# Patient Record
Sex: MALE | Race: WHITE | NOT HISPANIC OR LATINO | Employment: OTHER
[De-identification: names, ages, dates, MRNs, and addresses within clinical notes are randomized per-mention and may not be internally consistent; named-entity substitution may affect disease eponyms.]

---

## 2020-06-16 ENCOUNTER — IMAGING SERVICES (OUTPATIENT)
Dept: OTHER | Age: 65
End: 2020-06-16

## 2020-07-09 ENCOUNTER — APPOINTMENT (OUTPATIENT)
Dept: NEUROSURGERY | Age: 65
End: 2020-07-09

## 2020-07-28 ENCOUNTER — TELEPHONE (OUTPATIENT)
Dept: NEUROSURGERY | Age: 65
End: 2020-07-28

## 2020-08-05 ENCOUNTER — TELEPHONE (OUTPATIENT)
Dept: NEUROSURGERY | Age: 65
End: 2020-08-05

## 2020-08-06 ENCOUNTER — OFFICE VISIT (OUTPATIENT)
Dept: NEUROSURGERY | Age: 65
End: 2020-08-06

## 2020-08-06 ENCOUNTER — TELEPHONE (OUTPATIENT)
Dept: NEUROSURGERY | Age: 65
End: 2020-08-06

## 2020-08-06 DIAGNOSIS — G89.29 CHRONIC BILATERAL LOW BACK PAIN, UNSPECIFIED WHETHER SCIATICA PRESENT: Primary | ICD-10-CM

## 2020-08-06 DIAGNOSIS — M54.50 CHRONIC BILATERAL LOW BACK PAIN, UNSPECIFIED WHETHER SCIATICA PRESENT: Primary | ICD-10-CM

## 2020-08-06 DIAGNOSIS — M54.2 NECK PAIN: Primary | ICD-10-CM

## 2020-08-06 PROCEDURE — 99203 OFFICE O/P NEW LOW 30 MIN: CPT | Performed by: NEUROLOGICAL SURGERY

## 2020-08-06 RX ORDER — ATORVASTATIN CALCIUM 10 MG/1
10 TABLET, FILM COATED ORAL DAILY
COMMUNITY
End: 2022-06-06

## 2020-08-06 SDOH — HEALTH STABILITY: MENTAL HEALTH: HOW OFTEN DO YOU HAVE A DRINK CONTAINING ALCOHOL?: NEVER

## 2020-08-06 ASSESSMENT — PAIN SCALES - GENERAL: PAINLEVEL: 5-6

## 2020-09-28 ENCOUNTER — TELEPHONE (OUTPATIENT)
Dept: NEUROSURGERY | Age: 65
End: 2020-09-28

## 2020-09-28 DIAGNOSIS — M54.50 CHRONIC BILATERAL LOW BACK PAIN, UNSPECIFIED WHETHER SCIATICA PRESENT: Primary | ICD-10-CM

## 2020-09-28 DIAGNOSIS — G89.29 CHRONIC BILATERAL LOW BACK PAIN, UNSPECIFIED WHETHER SCIATICA PRESENT: Primary | ICD-10-CM

## 2020-10-01 ENCOUNTER — TELEPHONE (OUTPATIENT)
Dept: NEUROSURGERY | Age: 65
End: 2020-10-01

## 2021-01-01 ENCOUNTER — EXTERNAL RECORD (OUTPATIENT)
Dept: HEALTH INFORMATION MANAGEMENT | Age: 66
End: 2021-01-01

## 2021-05-25 VITALS — TEMPERATURE: 98.1 F | HEIGHT: 68 IN | DIASTOLIC BLOOD PRESSURE: 67 MMHG | SYSTOLIC BLOOD PRESSURE: 118 MMHG

## 2021-08-28 ENCOUNTER — HOSPITAL ENCOUNTER (OUTPATIENT)
Age: 66
Setting detail: OBSERVATION
Discharge: HOME | End: 2021-08-30
Attending: EMERGENCY MEDICINE | Admitting: INTERNAL MEDICINE

## 2021-08-28 ENCOUNTER — APPOINTMENT (OUTPATIENT)
Dept: GENERAL RADIOLOGY | Age: 66
End: 2021-08-28
Attending: EMERGENCY MEDICINE

## 2021-08-28 DIAGNOSIS — G89.29 CHRONIC BILATERAL BACK PAIN, UNSPECIFIED BACK LOCATION: Primary | ICD-10-CM

## 2021-08-28 DIAGNOSIS — R07.9 CHEST PAIN, UNSPECIFIED TYPE: ICD-10-CM

## 2021-08-28 DIAGNOSIS — M54.9 CHRONIC BILATERAL BACK PAIN, UNSPECIFIED BACK LOCATION: Primary | ICD-10-CM

## 2021-08-28 LAB
ALBUMIN SERPL-MCNC: 3.9 G/DL (ref 3.6–5.1)
ALBUMIN/GLOB SERPL: 1.2 {RATIO} (ref 1–2.4)
ALP SERPL-CCNC: 67 UNITS/L (ref 45–117)
ALT SERPL-CCNC: 40 UNITS/L
ANION GAP SERPL CALC-SCNC: 13 MMOL/L (ref 10–20)
AST SERPL-CCNC: 17 UNITS/L
ATRIAL RATE (BPM): 69
BASOPHILS # BLD: 0.1 K/MCL (ref 0–0.3)
BASOPHILS NFR BLD: 1 %
BILIRUB SERPL-MCNC: 0.4 MG/DL (ref 0.2–1)
BUN SERPL-MCNC: 17 MG/DL (ref 6–20)
BUN/CREAT SERPL: 25 (ref 7–25)
CALCIUM SERPL-MCNC: 8.7 MG/DL (ref 8.4–10.2)
CHLORIDE SERPL-SCNC: 106 MMOL/L (ref 98–107)
CO2 SERPL-SCNC: 27 MMOL/L (ref 21–32)
CREAT SERPL-MCNC: 0.69 MG/DL (ref 0.67–1.17)
DEPRECATED RDW RBC: 49.1 FL (ref 39–50)
EOSINOPHIL # BLD: 0.4 K/MCL (ref 0–0.5)
EOSINOPHIL NFR BLD: 4 %
ERYTHROCYTE [DISTWIDTH] IN BLOOD: 14 % (ref 11–15)
FASTING DURATION TIME PATIENT: ABNORMAL H
GFR SERPLBLD BASED ON 1.73 SQ M-ARVRAT: >90 ML/MIN
GLOBULIN SER-MCNC: 3.2 G/DL (ref 2–4)
GLUCOSE BLDC GLUCOMTR-MCNC: 94 MG/DL (ref 70–99)
GLUCOSE SERPL-MCNC: 141 MG/DL (ref 65–99)
HCT VFR BLD CALC: 48.9 % (ref 39–51)
HGB BLD-MCNC: 15.8 G/DL (ref 13–17)
IMM GRANULOCYTES # BLD AUTO: 0.1 K/MCL (ref 0–0.2)
IMM GRANULOCYTES # BLD: 1 %
LYMPHOCYTES # BLD: 2.4 K/MCL (ref 1–4)
LYMPHOCYTES NFR BLD: 25 %
MCH RBC QN AUTO: 31.2 PG (ref 26–34)
MCHC RBC AUTO-ENTMCNC: 32.3 G/DL (ref 32–36.5)
MCV RBC AUTO: 96.6 FL (ref 78–100)
MONOCYTES # BLD: 0.8 K/MCL (ref 0.3–0.9)
MONOCYTES NFR BLD: 8 %
NEUTROPHILS # BLD: 5.9 K/MCL (ref 1.8–7.7)
NEUTROPHILS NFR BLD: 61 %
NRBC BLD MANUAL-RTO: 0 /100 WBC
NT-PROBNP SERPL-MCNC: 87 PG/ML
P AXIS (DEGREES): 14
PLATELET # BLD AUTO: 222 K/MCL (ref 140–450)
POTASSIUM SERPL-SCNC: 4.5 MMOL/L (ref 3.4–5.1)
PR-INTERVAL (MSEC): 170
PROT SERPL-MCNC: 7.1 G/DL (ref 6.4–8.2)
QRS-INTERVAL (MSEC): 86
QT-INTERVAL (MSEC): 388
QTC: 416
R AXIS (DEGREES): 18
RBC # BLD: 5.06 MIL/MCL (ref 4.5–5.9)
REPORT TEXT: NORMAL
SARS-COV-2 RNA RESP QL NAA+PROBE: NOT DETECTED
SERVICE CMNT-IMP: NORMAL
SERVICE CMNT-IMP: NORMAL
SODIUM SERPL-SCNC: 141 MMOL/L (ref 135–145)
T AXIS (DEGREES): 45
TROPONIN I SERPL HS-MCNC: <0.02 NG/ML
VENTRICULAR RATE EKG/MIN (BPM): 69
WBC # BLD: 9.6 K/MCL (ref 4.2–11)

## 2021-08-28 PROCEDURE — 10002803 HB RX 637: Performed by: INTERNAL MEDICINE

## 2021-08-28 PROCEDURE — 10004651 HB RX, NO CHARGE ITEM: Performed by: INTERNAL MEDICINE

## 2021-08-28 PROCEDURE — 10002800 HB RX 250 W HCPCS: Performed by: EMERGENCY MEDICINE

## 2021-08-28 PROCEDURE — 83880 ASSAY OF NATRIURETIC PEPTIDE: CPT | Performed by: EMERGENCY MEDICINE

## 2021-08-28 PROCEDURE — 93010 ELECTROCARDIOGRAM REPORT: CPT | Performed by: EMERGENCY MEDICINE

## 2021-08-28 PROCEDURE — 93010 ELECTROCARDIOGRAM REPORT: CPT | Performed by: INTERNAL MEDICINE

## 2021-08-28 PROCEDURE — 71046 X-RAY EXAM CHEST 2 VIEWS: CPT

## 2021-08-28 PROCEDURE — G0378 HOSPITAL OBSERVATION PER HR: HCPCS

## 2021-08-28 PROCEDURE — 80053 COMPREHEN METABOLIC PANEL: CPT | Performed by: EMERGENCY MEDICINE

## 2021-08-28 PROCEDURE — C9803 HOPD COVID-19 SPEC COLLECT: HCPCS

## 2021-08-28 PROCEDURE — 82962 GLUCOSE BLOOD TEST: CPT

## 2021-08-28 PROCEDURE — 96374 THER/PROPH/DIAG INJ IV PUSH: CPT

## 2021-08-28 PROCEDURE — 84484 ASSAY OF TROPONIN QUANT: CPT | Performed by: EMERGENCY MEDICINE

## 2021-08-28 PROCEDURE — 36415 COLL VENOUS BLD VENIPUNCTURE: CPT

## 2021-08-28 PROCEDURE — 93005 ELECTROCARDIOGRAM TRACING: CPT | Performed by: EMERGENCY MEDICINE

## 2021-08-28 PROCEDURE — 85025 COMPLETE CBC W/AUTO DIFF WBC: CPT | Performed by: EMERGENCY MEDICINE

## 2021-08-28 PROCEDURE — 99284 EMERGENCY DEPT VISIT MOD MDM: CPT | Performed by: EMERGENCY MEDICINE

## 2021-08-28 PROCEDURE — 99285 EMERGENCY DEPT VISIT HI MDM: CPT

## 2021-08-28 PROCEDURE — 87635 SARS-COV-2 COVID-19 AMP PRB: CPT | Performed by: EMERGENCY MEDICINE

## 2021-08-28 RX ORDER — REGADENOSON 0.08 MG/ML
0.4 INJECTION, SOLUTION INTRAVENOUS ONCE
Status: DISCONTINUED | OUTPATIENT
Start: 2021-08-28 | End: 2021-08-29

## 2021-08-28 RX ORDER — ENOXAPARIN SODIUM 100 MG/ML
40 INJECTION SUBCUTANEOUS DAILY
Status: DISCONTINUED | OUTPATIENT
Start: 2021-08-28 | End: 2021-08-30 | Stop reason: HOSPADM

## 2021-08-28 RX ORDER — ATORVASTATIN CALCIUM 10 MG/1
10 TABLET, FILM COATED ORAL DAILY
Status: DISCONTINUED | OUTPATIENT
Start: 2021-08-28 | End: 2021-08-30 | Stop reason: HOSPADM

## 2021-08-28 RX ORDER — LOSARTAN POTASSIUM 50 MG/1
50 TABLET ORAL DAILY
COMMUNITY
Start: 2021-08-23

## 2021-08-28 RX ORDER — 0.9 % SODIUM CHLORIDE 0.9 %
2 VIAL (ML) INJECTION EVERY 12 HOURS SCHEDULED
Status: DISCONTINUED | OUTPATIENT
Start: 2021-08-28 | End: 2021-08-30 | Stop reason: HOSPADM

## 2021-08-28 RX ORDER — LIDOCAINE 4 G/G
1 PATCH TOPICAL DAILY
Status: DISCONTINUED | OUTPATIENT
Start: 2021-08-28 | End: 2021-08-30 | Stop reason: HOSPADM

## 2021-08-28 RX ADMIN — ATORVASTATIN CALCIUM 10 MG: 10 TABLET, FILM COATED ORAL at 21:35

## 2021-08-28 RX ADMIN — KETOROLAC TROMETHAMINE 15 MG: 15 INJECTION, SOLUTION INTRAMUSCULAR; INTRAVENOUS at 16:07

## 2021-08-28 RX ADMIN — SODIUM CHLORIDE, PRESERVATIVE FREE 2 ML: 5 INJECTION INTRAVENOUS at 21:52

## 2021-08-28 RX ADMIN — LIDOCAINE 1 PATCH: 246 PATCH TOPICAL at 20:59

## 2021-08-28 ASSESSMENT — PAIN SCALES - GENERAL
PAINLEVEL_OUTOF10: 6
PAINLEVEL_OUTOF10: 8

## 2021-08-28 ASSESSMENT — LIFESTYLE VARIABLES
AUDIT-C TOTAL SCORE: 2
ALCOHOL_USE_STATUS: NO OR LOW RISK WITH VALIDATED TOOL
HOW OFTEN DO YOU HAVE 6 OR MORE DRINKS ON ONE OCCASION: NEVER
HOW MANY STANDARD DRINKS CONTAINING ALCOHOL DO YOU HAVE ON A TYPICAL DAY: 0,1 OR 2
HOW OFTEN DO YOU HAVE A DRINK CONTAINING ALCOHOL: 2 TO 4 TIMES PER MONTH

## 2021-08-28 ASSESSMENT — ACTIVITIES OF DAILY LIVING (ADL)
ADL_SCORE: 12
ADL_SHORT_OF_BREATH: YES
CHRONIC_PAIN_PRESENT: NO
ADL_BEFORE_ADMISSION: INDEPENDENT
RECENT_DECLINE_ADL: NO

## 2021-08-28 ASSESSMENT — ENCOUNTER SYMPTOMS
CONSTIPATION: 0
COUGH: 0
VOMITING: 0
CHILLS: 0
FEVER: 0
DIAPHORESIS: 0
NUMBNESS: 1
FATIGUE: 0
LIGHT-HEADEDNESS: 1
EYE PAIN: 0
DIZZINESS: 1
RHINORRHEA: 0
NAUSEA: 0
ABDOMINAL PAIN: 0
WEAKNESS: 0
TREMORS: 0
HEADACHES: 0
SHORTNESS OF BREATH: 1
BACK PAIN: 1
POLYDIPSIA: 0
EYE REDNESS: 0
SPEECH DIFFICULTY: 0
BRUISES/BLEEDS EASILY: 0
DIARRHEA: 0
SORE THROAT: 0

## 2021-08-28 ASSESSMENT — COGNITIVE AND FUNCTIONAL STATUS - GENERAL
DO YOU HAVE SERIOUS DIFFICULTY WALKING OR CLIMBING STAIRS: NO
BECAUSE OF A PHYSICAL, MENTAL, OR EMOTIONAL CONDITION, DO YOU HAVE SERIOUS DIFFICULTY CONCENTRATING, REMEMBERING OR MAKING DECISIONS: NO
ARE YOU BLIND OR DO YOU HAVE SERIOUS DIFFICULTY SEEING, EVEN WHEN WEARING GLASSES: NO
BECAUSE OF A PHYSICAL, MENTAL, OR EMOTIONAL CONDITION, DO YOU HAVE DIFFICULTY DOING ERRANDS ALONE: NO
ARE YOU DEAF OR DO YOU HAVE SERIOUS DIFFICULTY  HEARING: NO
DO YOU HAVE DIFFICULTY DRESSING OR BATHING: NO

## 2021-08-29 LAB
GLUCOSE BLDC GLUCOMTR-MCNC: 107 MG/DL (ref 70–99)
GLUCOSE BLDC GLUCOMTR-MCNC: 133 MG/DL (ref 70–99)
GLUCOSE BLDC GLUCOMTR-MCNC: 152 MG/DL (ref 70–99)
TROPONIN I SERPL HS-MCNC: <0.02 NG/ML

## 2021-08-29 PROCEDURE — G0378 HOSPITAL OBSERVATION PER HR: HCPCS

## 2021-08-29 PROCEDURE — 82962 GLUCOSE BLOOD TEST: CPT

## 2021-08-29 PROCEDURE — 10002800 HB RX 250 W HCPCS: Performed by: INTERNAL MEDICINE

## 2021-08-29 PROCEDURE — 96376 TX/PRO/DX INJ SAME DRUG ADON: CPT

## 2021-08-29 PROCEDURE — 10004651 HB RX, NO CHARGE ITEM: Performed by: INTERNAL MEDICINE

## 2021-08-29 PROCEDURE — 10002803 HB RX 637: Performed by: INTERNAL MEDICINE

## 2021-08-29 PROCEDURE — 99243 OFF/OP CNSLTJ NEW/EST LOW 30: CPT | Performed by: INTERNAL MEDICINE

## 2021-08-29 PROCEDURE — 36415 COLL VENOUS BLD VENIPUNCTURE: CPT | Performed by: INTERNAL MEDICINE

## 2021-08-29 PROCEDURE — 84484 ASSAY OF TROPONIN QUANT: CPT | Performed by: INTERNAL MEDICINE

## 2021-08-29 RX ORDER — NICOTINE 21 MG/24HR
1 PATCH, TRANSDERMAL 24 HOURS TRANSDERMAL DAILY
Status: DISCONTINUED | OUTPATIENT
Start: 2021-08-29 | End: 2021-08-30 | Stop reason: HOSPADM

## 2021-08-29 RX ORDER — LOSARTAN POTASSIUM 50 MG/1
50 TABLET ORAL DAILY
Status: DISCONTINUED | OUTPATIENT
Start: 2021-08-30 | End: 2021-08-30 | Stop reason: HOSPADM

## 2021-08-29 RX ADMIN — LIDOCAINE 1 PATCH: 246 PATCH TOPICAL at 08:20

## 2021-08-29 RX ADMIN — Medication 1 PATCH: at 08:20

## 2021-08-29 RX ADMIN — SODIUM CHLORIDE, PRESERVATIVE FREE 2 ML: 5 INJECTION INTRAVENOUS at 08:20

## 2021-08-29 RX ADMIN — KETOROLAC TROMETHAMINE 15 MG: 30 INJECTION, SOLUTION INTRAMUSCULAR at 05:07

## 2021-08-29 RX ADMIN — ATORVASTATIN CALCIUM 10 MG: 10 TABLET, FILM COATED ORAL at 08:20

## 2021-08-29 RX ADMIN — KETOROLAC TROMETHAMINE 15 MG: 30 INJECTION, SOLUTION INTRAMUSCULAR at 18:20

## 2021-08-29 RX ADMIN — SODIUM CHLORIDE, PRESERVATIVE FREE 2 ML: 5 INJECTION INTRAVENOUS at 21:03

## 2021-08-29 RX ADMIN — METFORMIN HYDROCHLORIDE 500 MG: 500 TABLET ORAL at 08:20

## 2021-08-29 ASSESSMENT — ENCOUNTER SYMPTOMS
ABDOMINAL PAIN: 0
BRUISES/BLEEDS EASILY: 0
SHORTNESS OF BREATH: 0
EYES NEGATIVE: 1
COUGH: 0
FACIAL SWELLING: 0
PSYCHIATRIC NEGATIVE: 1
NEUROLOGICAL NEGATIVE: 1
SORE THROAT: 0
CHILLS: 0
CONSTITUTIONAL NEGATIVE: 1
WHEEZING: 0
SEIZURES: 0
VOMITING: 0
BLOOD IN STOOL: 0
PHOTOPHOBIA: 0
POLYPHAGIA: 0
HEADACHES: 0
FEVER: 0
HEMATOLOGIC/LYMPHATIC NEGATIVE: 1
LIGHT-HEADEDNESS: 0
RESPIRATORY NEGATIVE: 1
GASTROINTESTINAL NEGATIVE: 1
POLYDIPSIA: 0
ALLERGIC/IMMUNOLOGIC NEGATIVE: 1
SLEEP DISTURBANCE: 0
ENDOCRINE NEGATIVE: 1
UNEXPECTED WEIGHT CHANGE: 0
BACK PAIN: 1

## 2021-08-29 ASSESSMENT — PAIN SCALES - GENERAL
PAINLEVEL_OUTOF10: 8
PAINLEVEL_OUTOF10: 0
PAINLEVEL_OUTOF10: 0
PAINLEVEL_OUTOF10: 4
PAINLEVEL_OUTOF10: 8
PAINLEVEL_OUTOF10: 7

## 2021-08-30 ENCOUNTER — APPOINTMENT (OUTPATIENT)
Dept: CARDIOLOGY | Age: 66
End: 2021-08-30
Attending: INTERNAL MEDICINE

## 2021-08-30 ENCOUNTER — APPOINTMENT (OUTPATIENT)
Dept: MRI IMAGING | Age: 66
End: 2021-08-30
Attending: INTERNAL MEDICINE

## 2021-08-30 VITALS
RESPIRATION RATE: 12 BRPM | BODY MASS INDEX: 27.85 KG/M2 | SYSTOLIC BLOOD PRESSURE: 150 MMHG | HEART RATE: 64 BPM | TEMPERATURE: 97.9 F | HEIGHT: 67 IN | DIASTOLIC BLOOD PRESSURE: 67 MMHG | WEIGHT: 177.47 LBS | OXYGEN SATURATION: 98 %

## 2021-08-30 LAB
GLUCOSE BLDC GLUCOMTR-MCNC: 204 MG/DL (ref 70–99)
STRESS TARGET HR: 156 BPM

## 2021-08-30 PROCEDURE — G1004 CDSM NDSC: HCPCS

## 2021-08-30 PROCEDURE — 93351 STRESS TTE COMPLETE: CPT | Performed by: INTERNAL MEDICINE

## 2021-08-30 PROCEDURE — 10002803 HB RX 637: Performed by: INTERNAL MEDICINE

## 2021-08-30 PROCEDURE — 82962 GLUCOSE BLOOD TEST: CPT

## 2021-08-30 PROCEDURE — 93351 STRESS TTE COMPLETE: CPT

## 2021-08-30 PROCEDURE — 10002800 HB RX 250 W HCPCS

## 2021-08-30 PROCEDURE — 10002807 HB RX 258

## 2021-08-30 PROCEDURE — 99213 OFFICE O/P EST LOW 20 MIN: CPT | Performed by: INTERNAL MEDICINE

## 2021-08-30 PROCEDURE — 10002800 HB RX 250 W HCPCS: Performed by: INTERNAL MEDICINE

## 2021-08-30 PROCEDURE — 72148 MRI LUMBAR SPINE W/O DYE: CPT

## 2021-08-30 PROCEDURE — 96376 TX/PRO/DX INJ SAME DRUG ADON: CPT

## 2021-08-30 PROCEDURE — 10004651 HB RX, NO CHARGE ITEM: Performed by: INTERNAL MEDICINE

## 2021-08-30 PROCEDURE — G0378 HOSPITAL OBSERVATION PER HR: HCPCS

## 2021-08-30 PROCEDURE — 96375 TX/PRO/DX INJ NEW DRUG ADDON: CPT

## 2021-08-30 RX ORDER — ATROPINE SULFATE 0.1 MG/ML
0.5 INJECTION INTRAVENOUS PRN
Status: DISCONTINUED | OUTPATIENT
Start: 2021-08-30 | End: 2021-08-30 | Stop reason: HOSPADM

## 2021-08-30 RX ORDER — IBUPROFEN 800 MG/1
800 TABLET ORAL EVERY 6 HOURS PRN
Qty: 60 TABLET | Refills: 3 | Status: SHIPPED | OUTPATIENT
Start: 2021-08-30 | End: 2022-06-06

## 2021-08-30 RX ORDER — NICOTINE 21 MG/24HR
1 PATCH, TRANSDERMAL 24 HOURS TRANSDERMAL DAILY
Qty: 15 EACH | Refills: 0 | Status: SHIPPED | OUTPATIENT
Start: 2021-08-31 | End: 2022-06-06

## 2021-08-30 RX ORDER — LIDOCAINE 4 G/G
1 PATCH TOPICAL DAILY
Qty: 100 PATCH | Refills: 3 | Status: SHIPPED | OUTPATIENT
Start: 2021-08-31 | End: 2021-12-09

## 2021-08-30 RX ORDER — DOBUTAMINE HYDROCHLORIDE 200 MG/100ML
INJECTION INTRAVENOUS
Status: DISCONTINUED
Start: 2021-08-30 | End: 2021-08-30 | Stop reason: HOSPADM

## 2021-08-30 RX ORDER — SODIUM CHLORIDE 9 MG/ML
INJECTION, SOLUTION INTRAVENOUS
Status: DISCONTINUED
Start: 2021-08-30 | End: 2021-08-30 | Stop reason: HOSPADM

## 2021-08-30 RX ORDER — DOBUTAMINE HYDROCHLORIDE 200 MG/100ML
10 INJECTION INTRAVENOUS ONCE
Status: DISCONTINUED | OUTPATIENT
Start: 2021-08-30 | End: 2021-08-30 | Stop reason: HOSPADM

## 2021-08-30 RX ADMIN — LIDOCAINE 1 PATCH: 246 PATCH TOPICAL at 11:57

## 2021-08-30 RX ADMIN — Medication 1 PATCH: at 11:56

## 2021-08-30 RX ADMIN — ATROPINE SULFATE 0.5 MG: 0.1 INJECTION PARENTERAL at 10:13

## 2021-08-30 RX ADMIN — METFORMIN HYDROCHLORIDE 500 MG: 500 TABLET ORAL at 11:51

## 2021-08-30 RX ADMIN — KETOROLAC TROMETHAMINE 15 MG: 30 INJECTION, SOLUTION INTRAMUSCULAR at 14:03

## 2021-08-30 RX ADMIN — ATROPINE SULFATE 0.5 MG: 0.1 INJECTION INTRAVENOUS at 10:13

## 2021-08-30 RX ADMIN — SODIUM CHLORIDE, PRESERVATIVE FREE 2 ML: 5 INJECTION INTRAVENOUS at 14:05

## 2021-08-30 RX ADMIN — LOSARTAN POTASSIUM 50 MG: 50 TABLET, FILM COATED ORAL at 11:51

## 2021-08-30 RX ADMIN — ATORVASTATIN CALCIUM 10 MG: 10 TABLET, FILM COATED ORAL at 11:51

## 2021-08-30 ASSESSMENT — ENCOUNTER SYMPTOMS
COUGH: 0
WHEEZING: 0
FEVER: 0
SEIZURES: 0
PHOTOPHOBIA: 0
FACIAL SWELLING: 0
BLOOD IN STOOL: 0
SLEEP DISTURBANCE: 0
BACK PAIN: 1
POLYPHAGIA: 0
UNEXPECTED WEIGHT CHANGE: 0
ABDOMINAL PAIN: 0
VOMITING: 0
LIGHT-HEADEDNESS: 0
POLYDIPSIA: 0
SHORTNESS OF BREATH: 0
SORE THROAT: 0
BRUISES/BLEEDS EASILY: 0
HEADACHES: 0
CHILLS: 0

## 2021-08-30 ASSESSMENT — PAIN SCALES - WONG BAKER: WONGBAKER_NUMERICALRESPONSE: 7

## 2021-08-30 ASSESSMENT — PAIN SCALES - GENERAL
PAINLEVEL_OUTOF10: 7
PAINLEVEL_OUTOF10: 0

## 2021-08-31 ENCOUNTER — TELEPHONE (OUTPATIENT)
Dept: SCHEDULING | Age: 66
End: 2021-08-31

## 2021-09-14 ENCOUNTER — OFFICE VISIT (OUTPATIENT)
Dept: SURGERY | Age: 66
End: 2021-09-14

## 2021-09-14 VITALS
HEIGHT: 67 IN | HEART RATE: 71 BPM | DIASTOLIC BLOOD PRESSURE: 74 MMHG | WEIGHT: 177 LBS | SYSTOLIC BLOOD PRESSURE: 154 MMHG | BODY MASS INDEX: 27.78 KG/M2

## 2021-09-14 DIAGNOSIS — K42.9 UMBILICAL HERNIA WITHOUT OBSTRUCTION AND WITHOUT GANGRENE: Primary | ICD-10-CM

## 2021-09-14 PROCEDURE — 99242 OFF/OP CONSLTJ NEW/EST SF 20: CPT | Performed by: SURGERY

## 2021-09-16 ENCOUNTER — OFFICE VISIT (OUTPATIENT)
Dept: NEUROSURGERY | Age: 66
End: 2021-09-16

## 2021-09-16 VITALS
HEART RATE: 66 BPM | HEIGHT: 67 IN | TEMPERATURE: 97.6 F | DIASTOLIC BLOOD PRESSURE: 79 MMHG | WEIGHT: 177 LBS | SYSTOLIC BLOOD PRESSURE: 147 MMHG | BODY MASS INDEX: 27.78 KG/M2

## 2021-09-16 DIAGNOSIS — M54.50 CHRONIC BILATERAL LOW BACK PAIN, UNSPECIFIED WHETHER SCIATICA PRESENT: Primary | ICD-10-CM

## 2021-09-16 DIAGNOSIS — G89.29 CHRONIC BILATERAL LOW BACK PAIN, UNSPECIFIED WHETHER SCIATICA PRESENT: Primary | ICD-10-CM

## 2021-09-16 DIAGNOSIS — M54.2 NECK PAIN: ICD-10-CM

## 2021-09-16 PROCEDURE — 99214 OFFICE O/P EST MOD 30 MIN: CPT | Performed by: PHYSICIAN ASSISTANT

## 2021-09-16 ASSESSMENT — PAIN SCALES - GENERAL: PAINLEVEL: 8

## 2021-09-20 ASSESSMENT — ENCOUNTER SYMPTOMS
CONSTITUTIONAL NEGATIVE: 1
ALLERGIC/IMMUNOLOGIC NEGATIVE: 1
HEMATOLOGIC/LYMPHATIC NEGATIVE: 1
GASTROINTESTINAL NEGATIVE: 1
ENDOCRINE NEGATIVE: 1
EYES NEGATIVE: 1
NEUROLOGICAL NEGATIVE: 1
RESPIRATORY NEGATIVE: 1
PSYCHIATRIC NEGATIVE: 1

## 2021-09-22 ENCOUNTER — DOCUMENTATION (OUTPATIENT)
Dept: SURGERY | Age: 66
End: 2021-09-22

## 2021-09-22 ENCOUNTER — TELEPHONE (OUTPATIENT)
Dept: SURGERY | Age: 66
End: 2021-09-22

## 2021-09-22 DIAGNOSIS — Z01.812 PRE-PROCEDURAL LABORATORY EXAMINATION: Primary | ICD-10-CM

## 2021-09-23 ENCOUNTER — TELEPHONE (OUTPATIENT)
Dept: INTERVENTIONAL RADIOLOGY/VASCULAR | Age: 66
End: 2021-09-23

## 2021-09-23 DIAGNOSIS — R29.898 LEG WEAKNESS, BILATERAL: ICD-10-CM

## 2021-09-23 DIAGNOSIS — M48.061 SPINAL STENOSIS OF LUMBAR REGION, UNSPECIFIED WHETHER NEUROGENIC CLAUDICATION PRESENT: ICD-10-CM

## 2021-09-23 DIAGNOSIS — M54.50 CHRONIC BILATERAL LOW BACK PAIN, UNSPECIFIED WHETHER SCIATICA PRESENT: Primary | ICD-10-CM

## 2021-09-23 DIAGNOSIS — M47.22 CERVICAL SPONDYLOSIS WITH RADICULOPATHY: ICD-10-CM

## 2021-09-23 DIAGNOSIS — G89.29 CHRONIC BILATERAL LOW BACK PAIN, UNSPECIFIED WHETHER SCIATICA PRESENT: Primary | ICD-10-CM

## 2021-09-27 ENCOUNTER — TELEPHONE (OUTPATIENT)
Dept: INTERVENTIONAL RADIOLOGY/VASCULAR | Age: 66
End: 2021-09-27

## 2021-09-27 ENCOUNTER — HOSPITAL ENCOUNTER (OUTPATIENT)
Dept: MRI IMAGING | Age: 66
Discharge: HOME | End: 2021-09-27
Attending: PHYSICIAN ASSISTANT

## 2021-09-27 ENCOUNTER — LAB SERVICES (OUTPATIENT)
Dept: LAB | Age: 66
End: 2021-09-27

## 2021-09-27 DIAGNOSIS — M47.22 CERVICAL SPONDYLOSIS WITH RADICULOPATHY: ICD-10-CM

## 2021-09-27 DIAGNOSIS — Z01.812 PRE-PROCEDURAL LABORATORY EXAMINATION: ICD-10-CM

## 2021-09-27 DIAGNOSIS — G89.29 CHRONIC BILATERAL LOW BACK PAIN, UNSPECIFIED WHETHER SCIATICA PRESENT: ICD-10-CM

## 2021-09-27 DIAGNOSIS — M54.50 CHRONIC BILATERAL LOW BACK PAIN, UNSPECIFIED WHETHER SCIATICA PRESENT: ICD-10-CM

## 2021-09-27 DIAGNOSIS — M54.2 NECK PAIN: ICD-10-CM

## 2021-09-27 PROCEDURE — U0005 INFEC AGEN DETEC AMPLI PROBE: HCPCS | Performed by: SURGERY

## 2021-09-27 PROCEDURE — G1004 CDSM NDSC: HCPCS

## 2021-09-27 PROCEDURE — 72146 MRI CHEST SPINE W/O DYE: CPT

## 2021-09-27 PROCEDURE — 72141 MRI NECK SPINE W/O DYE: CPT

## 2021-09-27 PROCEDURE — U0003 INFECTIOUS AGENT DETECTION BY NUCLEIC ACID (DNA OR RNA); SEVERE ACUTE RESPIRATORY SYNDROME CORONAVIRUS 2 (SARS-COV-2) (CORONAVIRUS DISEASE [COVID-19]), AMPLIFIED PROBE TECHNIQUE, MAKING USE OF HIGH THROUGHPUT TECHNOLOGIES AS DESCRIBED BY CMS-2020-01-R: HCPCS | Performed by: SURGERY

## 2021-09-27 ASSESSMENT — ACTIVITIES OF DAILY LIVING (ADL)
ADL_BEFORE_ADMISSION: INDEPENDENT
HISTORY OF FALLING IN THE LAST YEAR (PRIOR TO ADMISSION): YES
ADL_SCORE: 12
RECENT_DECLINE_ADL: NO
ADL_SHORT_OF_BREATH: NO
NEEDS_ASSIST: NO

## 2021-09-27 ASSESSMENT — COGNITIVE AND FUNCTIONAL STATUS - GENERAL
ARE YOU BLIND OR DO YOU HAVE SERIOUS DIFFICULTY SEEING, EVEN WHEN WEARING GLASSES: NO
ARE YOU DEAF OR DO YOU HAVE SERIOUS DIFFICULTY  HEARING: NO

## 2021-09-28 ENCOUNTER — TELEPHONE (OUTPATIENT)
Dept: NEUROSURGERY | Age: 66
End: 2021-09-28

## 2021-09-28 LAB
SARS-COV-2 RNA RESP QL NAA+PROBE: NOT DETECTED
SERVICE CMNT-IMP: NORMAL
SERVICE CMNT-IMP: NORMAL

## 2021-09-28 RX ORDER — 0.9 % SODIUM CHLORIDE 0.9 %
2 VIAL (ML) INJECTION EVERY 12 HOURS SCHEDULED
Status: CANCELLED | OUTPATIENT
Start: 2021-09-28

## 2021-09-28 RX ORDER — SODIUM CHLORIDE, SODIUM LACTATE, POTASSIUM CHLORIDE, CALCIUM CHLORIDE 600; 310; 30; 20 MG/100ML; MG/100ML; MG/100ML; MG/100ML
INJECTION, SOLUTION INTRAVENOUS CONTINUOUS
Status: CANCELLED | OUTPATIENT
Start: 2021-09-28

## 2021-09-29 ENCOUNTER — ANESTHESIA (OUTPATIENT)
Dept: SURGERY | Age: 66
End: 2021-09-29

## 2021-09-29 ENCOUNTER — ANESTHESIA EVENT (OUTPATIENT)
Dept: SURGERY | Age: 66
End: 2021-09-29

## 2021-09-29 ENCOUNTER — HOSPITAL ENCOUNTER (OUTPATIENT)
Age: 66
Discharge: HOME | End: 2021-09-29
Attending: SURGERY | Admitting: SURGERY

## 2021-09-29 VITALS
OXYGEN SATURATION: 99 % | HEIGHT: 67 IN | HEART RATE: 71 BPM | SYSTOLIC BLOOD PRESSURE: 133 MMHG | WEIGHT: 177 LBS | TEMPERATURE: 97.2 F | RESPIRATION RATE: 16 BRPM | BODY MASS INDEX: 27.78 KG/M2 | DIASTOLIC BLOOD PRESSURE: 78 MMHG

## 2021-09-29 DIAGNOSIS — Z09 S/P UMBILICAL HERNIA REPAIR, FOLLOW-UP EXAM: Primary | ICD-10-CM

## 2021-09-29 LAB
GLUCOSE BLDC GLUCOMTR-MCNC: 131 MG/DL (ref 70–99)
GLUCOSE BLDC GLUCOMTR-MCNC: 143 MG/DL (ref 70–99)

## 2021-09-29 PROCEDURE — 10002800 HB RX 250 W HCPCS

## 2021-09-29 PROCEDURE — 13000035 HB BASIC CASE EA ADD MINUTE: Performed by: SURGERY

## 2021-09-29 PROCEDURE — 13000003 HB ANESTHESIA  GENERAL EA ADD MINUTE: Performed by: SURGERY

## 2021-09-29 PROCEDURE — 10002807 HB RX 258: Performed by: GENERAL ACUTE CARE HOSPITAL

## 2021-09-29 PROCEDURE — 49585 REPAIR UMBILICAL HERN,5+Y/O,REDUC: CPT | Performed by: SURGERY

## 2021-09-29 PROCEDURE — 13000002 HB ANESTHESIA  GENERAL  S/U + 1ST 15 MIN: Performed by: SURGERY

## 2021-09-29 PROCEDURE — 10002803 HB RX 637: Performed by: GENERAL ACUTE CARE HOSPITAL

## 2021-09-29 PROCEDURE — 10004451 HB PACU RECOVERY 1ST 30 MINUTES: Performed by: SURGERY

## 2021-09-29 PROCEDURE — 13000001 HB PHASE II RECOVERY EA 30 MINUTES: Performed by: SURGERY

## 2021-09-29 PROCEDURE — 13000034 HB BASIC CASE  S/U +1ST 15 MIN: Performed by: SURGERY

## 2021-09-29 PROCEDURE — 82962 GLUCOSE BLOOD TEST: CPT

## 2021-09-29 PROCEDURE — 10002800 HB RX 250 W HCPCS: Performed by: SURGERY

## 2021-09-29 PROCEDURE — 10002803 HB RX 637: Performed by: SURGERY

## 2021-09-29 PROCEDURE — 10002801 HB RX 250 W/O HCPCS: Performed by: ANESTHESIOLOGY

## 2021-09-29 PROCEDURE — 10002800 HB RX 250 W HCPCS: Performed by: ANESTHESIOLOGY

## 2021-09-29 PROCEDURE — 10004452 HB PACU ADDL 30 MINUTES: Performed by: SURGERY

## 2021-09-29 PROCEDURE — 10002807 HB RX 258: Performed by: ANESTHESIOLOGY

## 2021-09-29 PROCEDURE — 10006023 HB SUPPLY 272: Performed by: SURGERY

## 2021-09-29 PROCEDURE — 10006027 HB SUPPLY 278: Performed by: SURGERY

## 2021-09-29 PROCEDURE — C1781 MESH (IMPLANTABLE): HCPCS | Performed by: SURGERY

## 2021-09-29 DEVICE — IMPLANTABLE DEVICE: Type: IMPLANTABLE DEVICE | Site: ABDOMEN | Status: FUNCTIONAL

## 2021-09-29 RX ORDER — DEXTROSE MONOHYDRATE 25 G/50ML
25 INJECTION, SOLUTION INTRAVENOUS PRN
Status: CANCELLED | OUTPATIENT
Start: 2021-09-29

## 2021-09-29 RX ORDER — NICOTINE POLACRILEX 4 MG
30 LOZENGE BUCCAL
Status: DISCONTINUED | OUTPATIENT
Start: 2021-09-29 | End: 2021-09-29 | Stop reason: HOSPADM

## 2021-09-29 RX ORDER — PROPOFOL 10 MG/ML
INJECTION, EMULSION INTRAVENOUS PRN
Status: DISCONTINUED | OUTPATIENT
Start: 2021-09-29 | End: 2021-09-29

## 2021-09-29 RX ORDER — HYDROCODONE BITARTRATE AND ACETAMINOPHEN 5; 325 MG/1; MG/1
1 TABLET ORAL ONCE
Status: COMPLETED | OUTPATIENT
Start: 2021-09-29 | End: 2021-09-29

## 2021-09-29 RX ORDER — NICOTINE POLACRILEX 4 MG
30 LOZENGE BUCCAL PRN
Status: CANCELLED | OUTPATIENT
Start: 2021-09-29

## 2021-09-29 RX ORDER — NICOTINE POLACRILEX 4 MG
15 LOZENGE BUCCAL PRN
Status: CANCELLED | OUTPATIENT
Start: 2021-09-29

## 2021-09-29 RX ORDER — SODIUM CHLORIDE 9 MG/ML
INJECTION, SOLUTION INTRAVENOUS CONTINUOUS
Status: DISCONTINUED | OUTPATIENT
Start: 2021-09-29 | End: 2021-09-29 | Stop reason: HOSPADM

## 2021-09-29 RX ORDER — SODIUM CHLORIDE 9 MG/ML
INJECTION, SOLUTION INTRAVENOUS CONTINUOUS PRN
Status: DISCONTINUED | OUTPATIENT
Start: 2021-09-29 | End: 2021-09-29

## 2021-09-29 RX ORDER — DEXTROSE MONOHYDRATE 25 G/50ML
25 INJECTION, SOLUTION INTRAVENOUS PRN
Status: DISCONTINUED | OUTPATIENT
Start: 2021-09-29 | End: 2021-09-29 | Stop reason: HOSPADM

## 2021-09-29 RX ORDER — EPHEDRINE SULFATE/0.9% NACL/PF 50 MG/10ML
5 SYRINGE (ML) INTRAVENOUS PRN
Status: DISCONTINUED | OUTPATIENT
Start: 2021-09-29 | End: 2021-09-29 | Stop reason: HOSPADM

## 2021-09-29 RX ORDER — LIDOCAINE HYDROCHLORIDE 20 MG/ML
INJECTION, SOLUTION INFILTRATION; PERINEURAL PRN
Status: DISCONTINUED | OUTPATIENT
Start: 2021-09-29 | End: 2021-09-29

## 2021-09-29 RX ORDER — ONDANSETRON 2 MG/ML
INJECTION INTRAMUSCULAR; INTRAVENOUS
Status: COMPLETED
Start: 2021-09-29 | End: 2021-09-29

## 2021-09-29 RX ORDER — DEXTROSE MONOHYDRATE 25 G/50ML
12.5 INJECTION, SOLUTION INTRAVENOUS PRN
Status: CANCELLED | OUTPATIENT
Start: 2021-09-29

## 2021-09-29 RX ORDER — DEXTROSE MONOHYDRATE 50 MG/ML
INJECTION, SOLUTION INTRAVENOUS CONTINUOUS PRN
Status: DISCONTINUED | OUTPATIENT
Start: 2021-09-29 | End: 2021-09-29 | Stop reason: HOSPADM

## 2021-09-29 RX ORDER — SCOLOPAMINE TRANSDERMAL SYSTEM 1 MG/1
1 PATCH, EXTENDED RELEASE TRANSDERMAL
Status: DISCONTINUED | OUTPATIENT
Start: 2021-09-29 | End: 2021-09-29 | Stop reason: HOSPADM

## 2021-09-29 RX ORDER — ACETAMINOPHEN 325 MG/1
650 TABLET ORAL EVERY 4 HOURS PRN
Status: DISCONTINUED | OUTPATIENT
Start: 2021-09-29 | End: 2021-09-29 | Stop reason: HOSPADM

## 2021-09-29 RX ORDER — EPHEDRINE SULFATE/0.9% NACL/PF 50 MG/10ML
SYRINGE (ML) INTRAVENOUS PRN
Status: DISCONTINUED | OUTPATIENT
Start: 2021-09-29 | End: 2021-09-29

## 2021-09-29 RX ORDER — HYDRALAZINE HYDROCHLORIDE 20 MG/ML
5 INJECTION INTRAMUSCULAR; INTRAVENOUS EVERY 10 MIN PRN
Status: DISCONTINUED | OUTPATIENT
Start: 2021-09-29 | End: 2021-09-29 | Stop reason: HOSPADM

## 2021-09-29 RX ORDER — MIDAZOLAM HYDROCHLORIDE 1 MG/ML
INJECTION, SOLUTION INTRAMUSCULAR; INTRAVENOUS PRN
Status: DISCONTINUED | OUTPATIENT
Start: 2021-09-29 | End: 2021-09-29

## 2021-09-29 RX ORDER — PROCHLORPERAZINE EDISYLATE 5 MG/ML
5 INJECTION INTRAMUSCULAR; INTRAVENOUS EVERY 4 HOURS PRN
Status: DISCONTINUED | OUTPATIENT
Start: 2021-09-29 | End: 2021-09-29 | Stop reason: HOSPADM

## 2021-09-29 RX ORDER — ONDANSETRON 2 MG/ML
4 INJECTION INTRAMUSCULAR; INTRAVENOUS 2 TIMES DAILY PRN
Status: DISCONTINUED | OUTPATIENT
Start: 2021-09-29 | End: 2021-09-29 | Stop reason: HOSPADM

## 2021-09-29 RX ORDER — HYDROCODONE BITARTRATE AND ACETAMINOPHEN 5; 325 MG/1; MG/1
1 TABLET ORAL EVERY 6 HOURS PRN
Qty: 20 TABLET | Refills: 0 | Status: SHIPPED | OUTPATIENT
Start: 2021-09-29 | End: 2022-06-06

## 2021-09-29 RX ORDER — LIDOCAINE HYDROCHLORIDE 10 MG/ML
5-10 INJECTION, SOLUTION INFILTRATION; PERINEURAL PRN
Status: DISCONTINUED | OUTPATIENT
Start: 2021-09-29 | End: 2021-09-29 | Stop reason: HOSPADM

## 2021-09-29 RX ORDER — HUMAN INSULIN 100 [IU]/ML
INJECTION, SOLUTION SUBCUTANEOUS
Status: DISCONTINUED | OUTPATIENT
Start: 2021-09-29 | End: 2021-09-29 | Stop reason: HOSPADM

## 2021-09-29 RX ORDER — ACETAMINOPHEN 650 MG/1
650 SUPPOSITORY RECTAL EVERY 4 HOURS PRN
Status: DISCONTINUED | OUTPATIENT
Start: 2021-09-29 | End: 2021-09-29 | Stop reason: HOSPADM

## 2021-09-29 RX ORDER — METOCLOPRAMIDE HYDROCHLORIDE 5 MG/ML
5 INJECTION INTRAMUSCULAR; INTRAVENOUS EVERY 6 HOURS PRN
Status: DISCONTINUED | OUTPATIENT
Start: 2021-09-29 | End: 2021-09-29 | Stop reason: HOSPADM

## 2021-09-29 RX ADMIN — MIDAZOLAM HYDROCHLORIDE 2 MG: 1 INJECTION, SOLUTION INTRAMUSCULAR; INTRAVENOUS at 08:33

## 2021-09-29 RX ADMIN — FENTANYL CITRATE 100 MCG: 50 INJECTION, SOLUTION INTRAMUSCULAR; INTRAVENOUS at 08:38

## 2021-09-29 RX ADMIN — EPHEDRINE SULFATE 25 MG: 5 INJECTION INTRAVENOUS at 08:43

## 2021-09-29 RX ADMIN — PROPOFOL 150 MG: 10 INJECTION, EMULSION INTRAVENOUS at 08:38

## 2021-09-29 RX ADMIN — SCOLOPAMINE TRANSDERMAL SYSTEM 1 PATCH: 1 PATCH, EXTENDED RELEASE TRANSDERMAL at 06:50

## 2021-09-29 RX ADMIN — SODIUM CHLORIDE: 9 INJECTION, SOLUTION INTRAVENOUS at 08:30

## 2021-09-29 RX ADMIN — ONDANSETRON 4 MG: 2 INJECTION INTRAMUSCULAR; INTRAVENOUS at 09:21

## 2021-09-29 RX ADMIN — HYDROCODONE BITARTRATE AND ACETAMINOPHEN 1 TABLET: 5; 325 TABLET ORAL at 10:40

## 2021-09-29 RX ADMIN — FENTANYL CITRATE 50 MCG: 50 INJECTION INTRAMUSCULAR; INTRAVENOUS at 09:25

## 2021-09-29 RX ADMIN — FENTANYL CITRATE 50 MCG: 50 INJECTION INTRAMUSCULAR; INTRAVENOUS at 09:20

## 2021-09-29 RX ADMIN — VANCOMYCIN HYDROCHLORIDE 1500 MG: 10 INJECTION, POWDER, LYOPHILIZED, FOR SOLUTION INTRAVENOUS at 08:30

## 2021-09-29 RX ADMIN — EPHEDRINE SULFATE 25 MG: 5 INJECTION INTRAVENOUS at 08:41

## 2021-09-29 RX ADMIN — SODIUM CHLORIDE: 0.9 INJECTION, SOLUTION INTRAVENOUS at 06:51

## 2021-09-29 RX ADMIN — LIDOCAINE HYDROCHLORIDE 5 ML: 20 INJECTION, SOLUTION INFILTRATION; PERINEURAL at 08:38

## 2021-09-29 ASSESSMENT — PAIN SCALES - GENERAL
PAINLEVEL_OUTOF10: 0
PAINLEVEL_OUTOF10: 4
PAINLEVEL_OUTOF10: 4
PAINLEVEL_OUTOF10: 5
PAINLEVEL_OUTOF10: 5
PAINLEVEL_OUTOF10: 4
PAINLEVEL_OUTOF10: 0
PAINLEVEL_OUTOF10: 6
PAINLEVEL_OUTOF10: 6

## 2021-09-29 ASSESSMENT — ENCOUNTER SYMPTOMS: EXERCISE TOLERANCE: GOOD (>4 METS)

## 2021-09-29 ASSESSMENT — COPD QUESTIONNAIRES: COPD: 1

## 2021-10-04 ENCOUNTER — TELEPHONE (OUTPATIENT)
Dept: SCHEDULING | Age: 66
End: 2021-10-04

## 2021-10-07 ENCOUNTER — OFFICE VISIT (OUTPATIENT)
Dept: SURGERY | Age: 66
End: 2021-10-07

## 2021-10-07 VITALS
BODY MASS INDEX: 27.78 KG/M2 | TEMPERATURE: 97.8 F | WEIGHT: 177 LBS | HEIGHT: 67 IN | DIASTOLIC BLOOD PRESSURE: 82 MMHG | HEART RATE: 71 BPM | SYSTOLIC BLOOD PRESSURE: 147 MMHG

## 2021-10-07 DIAGNOSIS — Z09 STATUS POST UMBILICAL HERNIA REPAIR, FOLLOW-UP EXAM: Primary | ICD-10-CM

## 2021-10-07 PROCEDURE — 99024 POSTOP FOLLOW-UP VISIT: CPT | Performed by: SURGERY

## 2021-10-14 ENCOUNTER — OFFICE VISIT (OUTPATIENT)
Dept: NEUROSURGERY | Age: 66
End: 2021-10-14

## 2021-10-14 VITALS
SYSTOLIC BLOOD PRESSURE: 130 MMHG | DIASTOLIC BLOOD PRESSURE: 84 MMHG | HEIGHT: 67 IN | WEIGHT: 177 LBS | HEART RATE: 83 BPM | BODY MASS INDEX: 27.78 KG/M2

## 2021-10-14 DIAGNOSIS — M47.22 CERVICAL SPONDYLOSIS WITH RADICULOPATHY: Primary | ICD-10-CM

## 2021-10-14 PROCEDURE — 99214 OFFICE O/P EST MOD 30 MIN: CPT | Performed by: NEUROLOGICAL SURGERY

## 2021-10-14 ASSESSMENT — PAIN SCALES - GENERAL: PAINLEVEL: 8

## 2021-10-21 ENCOUNTER — OFFICE VISIT (OUTPATIENT)
Dept: SURGERY | Age: 66
End: 2021-10-21

## 2021-10-21 VITALS
BODY MASS INDEX: 27.78 KG/M2 | DIASTOLIC BLOOD PRESSURE: 70 MMHG | HEIGHT: 67 IN | SYSTOLIC BLOOD PRESSURE: 127 MMHG | HEART RATE: 70 BPM | WEIGHT: 177 LBS

## 2021-10-21 DIAGNOSIS — Z09 STATUS POST UMBILICAL HERNIA REPAIR, FOLLOW-UP EXAM: Primary | ICD-10-CM

## 2021-10-21 PROCEDURE — 99024 POSTOP FOLLOW-UP VISIT: CPT | Performed by: SURGERY

## 2021-10-22 ENCOUNTER — HOSPITAL ENCOUNTER (OUTPATIENT)
Dept: CT IMAGING | Age: 66
Discharge: HOME | End: 2021-10-22
Attending: NEUROLOGICAL SURGERY

## 2021-10-22 DIAGNOSIS — M47.22 CERVICAL SPONDYLOSIS WITH RADICULOPATHY: ICD-10-CM

## 2021-10-22 PROCEDURE — 72125 CT NECK SPINE W/O DYE: CPT

## 2021-10-22 PROCEDURE — G1004 CDSM NDSC: HCPCS

## 2021-10-25 ENCOUNTER — HOSPITAL ENCOUNTER (OUTPATIENT)
Dept: REHABILITATION | Age: 66
Discharge: STILL ADMITTED OR ADMINISTRATIVE | End: 2021-10-25
Attending: FAMILY MEDICINE

## 2021-10-25 PROCEDURE — 97161 PT EVAL LOW COMPLEX 20 MIN: CPT | Performed by: PHYSICAL THERAPIST

## 2021-10-25 ASSESSMENT — ENCOUNTER SYMPTOMS
PAIN SEVERITY NOW: 8
QUALITY: NUMBNESS
ALLEVIATING FACTORS: REST
QUALITY: SHARP
QUALITY: TINGLING

## 2021-10-28 ENCOUNTER — OFFICE VISIT (OUTPATIENT)
Dept: NEUROSURGERY | Age: 66
End: 2021-10-28

## 2021-10-28 VITALS
WEIGHT: 177 LBS | DIASTOLIC BLOOD PRESSURE: 75 MMHG | HEIGHT: 67 IN | SYSTOLIC BLOOD PRESSURE: 139 MMHG | BODY MASS INDEX: 27.78 KG/M2 | HEART RATE: 81 BPM

## 2021-10-28 DIAGNOSIS — G95.9 CERVICAL MYELOPATHY (CMD): Primary | ICD-10-CM

## 2021-10-28 PROCEDURE — 99214 OFFICE O/P EST MOD 30 MIN: CPT | Performed by: NEUROLOGICAL SURGERY

## 2021-10-28 ASSESSMENT — PAIN SCALES - GENERAL: PAINLEVEL: 8

## 2021-11-01 ENCOUNTER — HOSPITAL ENCOUNTER (OUTPATIENT)
Dept: REHABILITATION | Age: 66
Discharge: STILL ADMITTED OR ADMINISTRATIVE | End: 2021-11-01
Attending: FAMILY MEDICINE

## 2021-11-01 PROCEDURE — 97140 MANUAL THERAPY 1/> REGIONS: CPT | Performed by: PHYSICAL THERAPY ASSISTANT

## 2021-11-01 PROCEDURE — 97110 THERAPEUTIC EXERCISES: CPT | Performed by: PHYSICAL THERAPY ASSISTANT

## 2021-11-03 ENCOUNTER — HOSPITAL ENCOUNTER (OUTPATIENT)
Dept: REHABILITATION | Age: 66
Discharge: STILL ADMITTED OR ADMINISTRATIVE | End: 2021-11-03
Attending: FAMILY MEDICINE

## 2021-11-03 PROCEDURE — 97110 THERAPEUTIC EXERCISES: CPT | Performed by: PHYSICAL THERAPIST

## 2021-11-03 PROCEDURE — 97530 THERAPEUTIC ACTIVITIES: CPT | Performed by: PHYSICAL THERAPIST

## 2021-11-09 ENCOUNTER — TELEPHONE (OUTPATIENT)
Dept: SCHEDULING | Age: 66
End: 2021-11-09

## 2021-11-09 ENCOUNTER — HOSPITAL ENCOUNTER (OUTPATIENT)
Dept: REHABILITATION | Age: 66
Discharge: STILL ADMITTED OR ADMINISTRATIVE | End: 2021-11-09
Attending: FAMILY MEDICINE

## 2021-11-09 PROCEDURE — 97110 THERAPEUTIC EXERCISES: CPT | Performed by: PHYSICAL THERAPIST

## 2021-11-09 PROCEDURE — 97530 THERAPEUTIC ACTIVITIES: CPT | Performed by: PHYSICAL THERAPIST

## 2021-11-12 ENCOUNTER — TELEPHONE (OUTPATIENT)
Dept: NEUROSURGERY | Age: 66
End: 2021-11-12

## 2021-11-12 ENCOUNTER — HOSPITAL ENCOUNTER (OUTPATIENT)
Dept: REHABILITATION | Age: 66
Discharge: STILL ADMITTED OR ADMINISTRATIVE | End: 2021-11-12
Attending: FAMILY MEDICINE

## 2021-11-12 PROCEDURE — 97110 THERAPEUTIC EXERCISES: CPT | Performed by: PHYSICAL THERAPY ASSISTANT

## 2021-11-12 PROCEDURE — 97530 THERAPEUTIC ACTIVITIES: CPT | Performed by: PHYSICAL THERAPY ASSISTANT

## 2021-11-12 ASSESSMENT — ENCOUNTER SYMPTOMS
PAIN SCALE AT HIGHEST: 10
PAIN SCALE AT LOWEST: 7
PAIN SEVERITY NOW: 7

## 2021-11-18 ENCOUNTER — OFFICE VISIT (OUTPATIENT)
Dept: SURGERY | Age: 66
End: 2021-11-18

## 2021-11-18 ENCOUNTER — TELEPHONE (OUTPATIENT)
Dept: INTERVENTIONAL RADIOLOGY/VASCULAR | Age: 66
End: 2021-11-18

## 2021-11-18 VITALS
DIASTOLIC BLOOD PRESSURE: 88 MMHG | HEIGHT: 67 IN | WEIGHT: 177 LBS | BODY MASS INDEX: 27.78 KG/M2 | HEART RATE: 80 BPM | SYSTOLIC BLOOD PRESSURE: 132 MMHG

## 2021-11-18 DIAGNOSIS — Z09 STATUS POST UMBILICAL HERNIA REPAIR, FOLLOW-UP EXAM: Primary | ICD-10-CM

## 2021-11-18 PROCEDURE — 99024 POSTOP FOLLOW-UP VISIT: CPT | Performed by: SURGERY

## 2021-12-02 ENCOUNTER — OFFICE VISIT (OUTPATIENT)
Dept: NEUROSURGERY | Age: 66
End: 2021-12-02

## 2021-12-02 VITALS
HEIGHT: 68 IN | WEIGHT: 170 LBS | SYSTOLIC BLOOD PRESSURE: 144 MMHG | BODY MASS INDEX: 25.76 KG/M2 | RESPIRATION RATE: 16 BRPM | DIASTOLIC BLOOD PRESSURE: 89 MMHG | HEART RATE: 74 BPM

## 2021-12-02 DIAGNOSIS — M54.50 LOW BACK PAIN, UNSPECIFIED BACK PAIN LATERALITY, UNSPECIFIED CHRONICITY, UNSPECIFIED WHETHER SCIATICA PRESENT: Primary | ICD-10-CM

## 2021-12-02 PROCEDURE — 99214 OFFICE O/P EST MOD 30 MIN: CPT | Performed by: NEUROLOGICAL SURGERY

## 2021-12-02 RX ORDER — ERGOCALCIFEROL 1.25 MG/1
CAPSULE ORAL
COMMUNITY
Start: 2021-11-19 | End: 2022-06-06

## 2021-12-02 RX ORDER — METFORMIN HYDROCHLORIDE 500 MG/1
TABLET, EXTENDED RELEASE ORAL
COMMUNITY
Start: 2021-11-19 | End: 2022-06-06 | Stop reason: SDUPTHER

## 2021-12-02 RX ORDER — FLUTICASONE PROPIONATE 50 MCG
SPRAY, SUSPENSION (ML) NASAL
COMMUNITY
Start: 2021-10-08 | End: 2022-06-06

## 2021-12-02 RX ORDER — ALBUTEROL SULFATE 90 UG/1
AEROSOL, METERED RESPIRATORY (INHALATION)
COMMUNITY
Start: 2021-11-19 | End: 2022-06-06

## 2021-12-02 RX ORDER — GABAPENTIN 300 MG/1
CAPSULE ORAL
COMMUNITY
Start: 2021-10-26 | End: 2022-06-06

## 2021-12-02 RX ORDER — BLOOD SUGAR DIAGNOSTIC
STRIP MISCELLANEOUS
Status: ON HOLD | COMMUNITY
Start: 2021-11-19 | End: 2022-11-23 | Stop reason: HOSPADM

## 2021-12-02 RX ORDER — LANCETS 30 GAUGE
EACH MISCELLANEOUS
Status: ON HOLD | COMMUNITY
Start: 2021-11-19 | End: 2022-11-23 | Stop reason: HOSPADM

## 2021-12-02 RX ORDER — CETIRIZINE HYDROCHLORIDE 10 MG/1
TABLET ORAL
COMMUNITY
Start: 2021-10-08 | End: 2022-06-06

## 2021-12-02 ASSESSMENT — PAIN SCALES - GENERAL: PAINLEVEL: 8

## 2022-01-17 PROBLEM — M54.16 LUMBAR RADICULITIS: Status: ACTIVE | Noted: 2022-01-17

## 2022-03-10 RX ORDER — ESCITALOPRAM OXALATE 10 MG/1
10 TABLET ORAL DAILY
COMMUNITY

## 2022-03-14 ENCOUNTER — HOSPITAL ENCOUNTER (OUTPATIENT)
Dept: INTERVENTIONAL RADIOLOGY/VASCULAR | Facility: HOSPITAL | Age: 67
Discharge: HOME | End: 2022-03-14
Attending: SURGERY | Admitting: SURGERY
Payer: MEDICAID

## 2022-03-14 VITALS
BODY MASS INDEX: 28.39 KG/M2 | DIASTOLIC BLOOD PRESSURE: 76 MMHG | WEIGHT: 183 LBS | HEART RATE: 61 BPM | TEMPERATURE: 97 F | SYSTOLIC BLOOD PRESSURE: 139 MMHG | HEIGHT: 67.5 IN | OXYGEN SATURATION: 98 % | RESPIRATION RATE: 16 BRPM

## 2022-03-14 DIAGNOSIS — I70.219 ATHEROSCLER NATIVE ARTERIES THE EXTREMITIES W/INTERMIT CLAUDICATION (HCC): ICD-10-CM

## 2022-03-14 PROCEDURE — 047H3ZZ DILATION OF RIGHT EXTERNAL ILIAC ARTERY, PERCUTANEOUS APPROACH: ICD-10-PCS | Performed by: SURGERY

## 2022-03-14 PROCEDURE — 75630 X-RAY AORTA LEG ARTERIES: CPT

## 2022-03-14 PROCEDURE — 99152 MOD SED SAME PHYS/QHP 5/>YRS: CPT

## 2022-03-14 PROCEDURE — 047J3DZ DILATION OF LEFT EXTERNAL ILIAC ARTERY WITH INTRALUMINAL DEVICE, PERCUTANEOUS APPROACH: ICD-10-PCS | Performed by: SURGERY

## 2022-03-14 PROCEDURE — 37221 HC TRANSLUMINAL ANGIOPLASTY W STENT ILIAC UNILAT INIT: CPT

## 2022-03-14 PROCEDURE — 99153 MOD SED SAME PHYS/QHP EA: CPT

## 2022-03-14 PROCEDURE — B41DYZZ FLUOROSCOPY OF AORTA AND BILATERAL LOWER EXTREMITY ARTERIES USING OTHER CONTRAST: ICD-10-PCS | Performed by: SURGERY

## 2022-03-14 PROCEDURE — 37220 HC TRANSLUMINAL ANGIOPLASTY ILIAC UNILAT INITIAL: CPT

## 2022-03-14 RX ORDER — DIPHENHYDRAMINE HYDROCHLORIDE 50 MG/ML
INJECTION INTRAMUSCULAR; INTRAVENOUS
Status: COMPLETED
Start: 2022-03-14 | End: 2022-03-14

## 2022-03-14 RX ORDER — MIDAZOLAM HYDROCHLORIDE 1 MG/ML
INJECTION INTRAMUSCULAR; INTRAVENOUS
Status: COMPLETED
Start: 2022-03-14 | End: 2022-03-14

## 2022-03-14 RX ORDER — CLOPIDOGREL BISULFATE 75 MG/1
75 TABLET ORAL DAILY
Qty: 90 TABLET | Refills: 3 | Status: SHIPPED | OUTPATIENT
Start: 2022-03-14

## 2022-03-14 RX ORDER — SODIUM CHLORIDE 9 MG/ML
INJECTION, SOLUTION INTRAVENOUS CONTINUOUS
Status: DISCONTINUED | OUTPATIENT
Start: 2022-03-14 | End: 2022-03-14

## 2022-03-14 RX ORDER — HEPARIN SODIUM 5000 [USP'U]/ML
INJECTION, SOLUTION INTRAVENOUS; SUBCUTANEOUS
Status: COMPLETED
Start: 2022-03-14 | End: 2022-03-14

## 2022-03-14 RX ORDER — CLOPIDOGREL BISULFATE 75 MG/1
300 TABLET ORAL ONCE
Status: COMPLETED | OUTPATIENT
Start: 2022-03-14 | End: 2022-03-14

## 2022-03-14 RX ORDER — LIDOCAINE HYDROCHLORIDE 10 MG/ML
INJECTION, SOLUTION EPIDURAL; INFILTRATION; INTRACAUDAL; PERINEURAL
Status: COMPLETED
Start: 2022-03-14 | End: 2022-03-14

## 2022-03-14 RX ORDER — CLOPIDOGREL BISULFATE 75 MG/1
TABLET ORAL
Status: COMPLETED
Start: 2022-03-14 | End: 2022-03-14

## 2022-03-14 RX ADMIN — SODIUM CHLORIDE: 9 INJECTION, SOLUTION INTRAVENOUS at 08:15:00

## 2022-03-14 RX ADMIN — CLOPIDOGREL BISULFATE 300 MG: 75 TABLET ORAL at 12:30:00

## 2022-05-31 ENCOUNTER — TELEPHONE (OUTPATIENT)
Dept: SCHEDULING | Age: 67
End: 2022-05-31

## 2022-06-06 ENCOUNTER — EXTERNAL RECORD (OUTPATIENT)
Dept: CARDIOLOGY | Age: 67
End: 2022-06-06

## 2022-06-06 ENCOUNTER — APPOINTMENT (OUTPATIENT)
Dept: CARDIOLOGY | Age: 67
End: 2022-06-06

## 2022-06-06 ENCOUNTER — OFFICE VISIT (OUTPATIENT)
Dept: CARDIOLOGY | Age: 67
End: 2022-06-06

## 2022-06-06 VITALS
SYSTOLIC BLOOD PRESSURE: 140 MMHG | DIASTOLIC BLOOD PRESSURE: 72 MMHG | HEIGHT: 67 IN | WEIGHT: 178.79 LBS | HEART RATE: 72 BPM | BODY MASS INDEX: 28.06 KG/M2

## 2022-06-06 DIAGNOSIS — Z01.810 PREOPERATIVE CARDIOVASCULAR EXAMINATION: Primary | ICD-10-CM

## 2022-06-06 DIAGNOSIS — Z79.82 ASPIRIN LONG-TERM USE: ICD-10-CM

## 2022-06-06 DIAGNOSIS — E11.9 CONTROLLED TYPE 2 DIABETES MELLITUS WITHOUT COMPLICATION, WITHOUT LONG-TERM CURRENT USE OF INSULIN (CMD): ICD-10-CM

## 2022-06-06 DIAGNOSIS — Z72.0 TOBACCO ABUSE: ICD-10-CM

## 2022-06-06 DIAGNOSIS — E78.2 MIXED HYPERLIPIDEMIA: ICD-10-CM

## 2022-06-06 DIAGNOSIS — I10 PRIMARY HYPERTENSION: ICD-10-CM

## 2022-06-06 PROCEDURE — 3077F SYST BP >= 140 MM HG: CPT | Performed by: INTERNAL MEDICINE

## 2022-06-06 PROCEDURE — 93000 ELECTROCARDIOGRAM COMPLETE: CPT | Performed by: INTERNAL MEDICINE

## 2022-06-06 PROCEDURE — 3078F DIAST BP <80 MM HG: CPT | Performed by: INTERNAL MEDICINE

## 2022-06-06 PROCEDURE — 99244 OFF/OP CNSLTJ NEW/EST MOD 40: CPT | Performed by: INTERNAL MEDICINE

## 2022-06-06 RX ORDER — ATORVASTATIN CALCIUM 80 MG/1
80 TABLET, FILM COATED ORAL DAILY
COMMUNITY
Start: 2022-05-23

## 2022-06-06 RX ORDER — ESCITALOPRAM OXALATE 10 MG/1
10 TABLET ORAL DAILY
COMMUNITY
Start: 2022-05-25

## 2022-06-06 ASSESSMENT — ENCOUNTER SYMPTOMS
ENDOCRINE NEGATIVE: 1
SHORTNESS OF BREATH: 0
GASTROINTESTINAL NEGATIVE: 1
ALLERGIC/IMMUNOLOGIC NEGATIVE: 1
HEMATOLOGIC/LYMPHATIC NEGATIVE: 1
TREMORS: 1
CONSTITUTIONAL NEGATIVE: 1
RESPIRATORY NEGATIVE: 1
PSYCHIATRIC NEGATIVE: 1
EYES NEGATIVE: 1
CHEST TIGHTNESS: 0

## 2022-11-22 ENCOUNTER — HOSPITAL ENCOUNTER (OUTPATIENT)
Age: 67
Setting detail: OBSERVATION
Discharge: HOME | End: 2022-11-23
Attending: STUDENT IN AN ORGANIZED HEALTH CARE EDUCATION/TRAINING PROGRAM | Admitting: INTERNAL MEDICINE

## 2022-11-22 ENCOUNTER — APPOINTMENT (OUTPATIENT)
Dept: GENERAL RADIOLOGY | Age: 67
End: 2022-11-22
Attending: STUDENT IN AN ORGANIZED HEALTH CARE EDUCATION/TRAINING PROGRAM

## 2022-11-22 DIAGNOSIS — R07.9 CHEST PAIN, UNSPECIFIED TYPE: Primary | ICD-10-CM

## 2022-11-22 LAB
ALBUMIN SERPL-MCNC: 3.6 G/DL (ref 3.6–5.1)
ALBUMIN/GLOB SERPL: 1.2 {RATIO} (ref 1–2.4)
ALP SERPL-CCNC: 78 UNITS/L (ref 45–117)
ALT SERPL-CCNC: 41 UNITS/L
ANION GAP SERPL CALC-SCNC: 12 MMOL/L (ref 7–19)
APPEARANCE UR: CLEAR
AST SERPL-CCNC: 17 UNITS/L
BASOPHILS # BLD: 0.1 K/MCL (ref 0–0.3)
BASOPHILS NFR BLD: 1 %
BILIRUB SERPL-MCNC: 0.3 MG/DL (ref 0.2–1)
BILIRUB UR QL STRIP: NEGATIVE
BUN SERPL-MCNC: 25 MG/DL (ref 6–20)
BUN/CREAT SERPL: 17 (ref 7–25)
CALCIUM SERPL-MCNC: 9 MG/DL (ref 8.4–10.2)
CHLORIDE SERPL-SCNC: 106 MMOL/L (ref 97–110)
CO2 SERPL-SCNC: 27 MMOL/L (ref 21–32)
COLOR UR: YELLOW
CREAT SERPL-MCNC: 1.51 MG/DL (ref 0.67–1.17)
DEPRECATED RDW RBC: 49.5 FL (ref 39–50)
EOSINOPHIL # BLD: 0.3 K/MCL (ref 0–0.5)
EOSINOPHIL NFR BLD: 3 %
ERYTHROCYTE [DISTWIDTH] IN BLOOD: 13.7 % (ref 11–15)
FASTING DURATION TIME PATIENT: ABNORMAL H
FLUAV RNA RESP QL NAA+PROBE: NOT DETECTED
FLUBV RNA RESP QL NAA+PROBE: NOT DETECTED
GFR SERPLBLD BASED ON 1.73 SQ M-ARVRAT: 51 ML/MIN
GLOBULIN SER-MCNC: 3.1 G/DL (ref 2–4)
GLUCOSE SERPL-MCNC: 127 MG/DL (ref 70–99)
GLUCOSE UR STRIP-MCNC: NEGATIVE MG/DL
HCT VFR BLD CALC: 45.9 % (ref 39–51)
HGB BLD-MCNC: 14.9 G/DL (ref 13–17)
HGB UR QL STRIP: NEGATIVE
IMM GRANULOCYTES # BLD AUTO: 0 K/MCL (ref 0–0.2)
IMM GRANULOCYTES # BLD: 0 %
KETONES UR STRIP-MCNC: NEGATIVE MG/DL
LEUKOCYTE ESTERASE UR QL STRIP: NEGATIVE
LYMPHOCYTES # BLD: 1.9 K/MCL (ref 1–4)
LYMPHOCYTES NFR BLD: 20 %
MCH RBC QN AUTO: 31.9 PG (ref 26–34)
MCHC RBC AUTO-ENTMCNC: 32.5 G/DL (ref 32–36.5)
MCV RBC AUTO: 98.3 FL (ref 78–100)
MONOCYTES # BLD: 0.7 K/MCL (ref 0.3–0.9)
MONOCYTES NFR BLD: 7 %
NEUTROPHILS # BLD: 6.6 K/MCL (ref 1.8–7.7)
NEUTROPHILS NFR BLD: 69 %
NITRITE UR QL STRIP: NEGATIVE
NRBC BLD MANUAL-RTO: 0 /100 WBC
PH UR STRIP: 7 [PH] (ref 5–7)
PLATELET # BLD AUTO: 198 K/MCL (ref 140–450)
POTASSIUM SERPL-SCNC: 4.1 MMOL/L (ref 3.4–5.1)
PROT SERPL-MCNC: 6.7 G/DL (ref 6.4–8.2)
PROT UR STRIP-MCNC: NEGATIVE MG/DL
RBC # BLD: 4.67 MIL/MCL (ref 4.5–5.9)
RSV AG NPH QL IA.RAPID: NOT DETECTED
SARS-COV-2 RNA RESP QL NAA+PROBE: NOT DETECTED
SERVICE CMNT-IMP: NORMAL
SERVICE CMNT-IMP: NORMAL
SODIUM SERPL-SCNC: 141 MMOL/L (ref 135–145)
SP GR UR STRIP: 1.02 (ref 1–1.03)
TROPONIN I SERPL DL<=0.01 NG/ML-MCNC: 9 NG/L
UROBILINOGEN UR STRIP-MCNC: 0.2 MG/DL
WBC # BLD: 9.4 K/MCL (ref 4.2–11)

## 2022-11-22 PROCEDURE — 80053 COMPREHEN METABOLIC PANEL: CPT | Performed by: STUDENT IN AN ORGANIZED HEALTH CARE EDUCATION/TRAINING PROGRAM

## 2022-11-22 PROCEDURE — 10002807 HB RX 258: Performed by: STUDENT IN AN ORGANIZED HEALTH CARE EDUCATION/TRAINING PROGRAM

## 2022-11-22 PROCEDURE — 93005 ELECTROCARDIOGRAM TRACING: CPT | Performed by: STUDENT IN AN ORGANIZED HEALTH CARE EDUCATION/TRAINING PROGRAM

## 2022-11-22 PROCEDURE — 99285 EMERGENCY DEPT VISIT HI MDM: CPT

## 2022-11-22 PROCEDURE — 71046 X-RAY EXAM CHEST 2 VIEWS: CPT

## 2022-11-22 PROCEDURE — 36415 COLL VENOUS BLD VENIPUNCTURE: CPT

## 2022-11-22 PROCEDURE — 96360 HYDRATION IV INFUSION INIT: CPT

## 2022-11-22 PROCEDURE — 84484 ASSAY OF TROPONIN QUANT: CPT | Performed by: STUDENT IN AN ORGANIZED HEALTH CARE EDUCATION/TRAINING PROGRAM

## 2022-11-22 PROCEDURE — 0241U COVID/FLU/RSV PANEL: CPT | Performed by: STUDENT IN AN ORGANIZED HEALTH CARE EDUCATION/TRAINING PROGRAM

## 2022-11-22 PROCEDURE — 81003 URINALYSIS AUTO W/O SCOPE: CPT | Performed by: STUDENT IN AN ORGANIZED HEALTH CARE EDUCATION/TRAINING PROGRAM

## 2022-11-22 PROCEDURE — 99285 EMERGENCY DEPT VISIT HI MDM: CPT | Performed by: STUDENT IN AN ORGANIZED HEALTH CARE EDUCATION/TRAINING PROGRAM

## 2022-11-22 PROCEDURE — 10004651 HB RX, NO CHARGE ITEM: Performed by: STUDENT IN AN ORGANIZED HEALTH CARE EDUCATION/TRAINING PROGRAM

## 2022-11-22 PROCEDURE — G0378 HOSPITAL OBSERVATION PER HR: HCPCS

## 2022-11-22 PROCEDURE — 93010 ELECTROCARDIOGRAM REPORT: CPT | Performed by: INTERNAL MEDICINE

## 2022-11-22 PROCEDURE — 85025 COMPLETE CBC W/AUTO DIFF WBC: CPT | Performed by: STUDENT IN AN ORGANIZED HEALTH CARE EDUCATION/TRAINING PROGRAM

## 2022-11-22 PROCEDURE — C9803 HOPD COVID-19 SPEC COLLECT: HCPCS

## 2022-11-22 RX ORDER — DEXTROSE MONOHYDRATE 25 G/50ML
25 INJECTION, SOLUTION INTRAVENOUS PRN
Status: DISCONTINUED | OUTPATIENT
Start: 2022-11-22 | End: 2022-11-23 | Stop reason: HOSPADM

## 2022-11-22 RX ORDER — LIDOCAINE 50 MG/G
PATCH TOPICAL
COMMUNITY
Start: 2022-11-22

## 2022-11-22 RX ORDER — METFORMIN HYDROCHLORIDE 500 MG/1
TABLET, EXTENDED RELEASE ORAL
COMMUNITY
Start: 2022-11-22

## 2022-11-22 RX ORDER — NICOTINE POLACRILEX 4 MG
30 LOZENGE BUCCAL PRN
Status: DISCONTINUED | OUTPATIENT
Start: 2022-11-22 | End: 2022-11-23 | Stop reason: HOSPADM

## 2022-11-22 RX ORDER — LIDOCAINE 4 G/G
1 PATCH TOPICAL DAILY
Status: DISCONTINUED | OUTPATIENT
Start: 2022-11-23 | End: 2022-11-23 | Stop reason: HOSPADM

## 2022-11-22 RX ORDER — ACETAMINOPHEN 325 MG/1
650 TABLET ORAL EVERY 4 HOURS PRN
Status: DISCONTINUED | OUTPATIENT
Start: 2022-11-22 | End: 2022-11-23 | Stop reason: HOSPADM

## 2022-11-22 RX ORDER — ENOXAPARIN SODIUM 100 MG/ML
30 INJECTION SUBCUTANEOUS DAILY
Status: DISCONTINUED | OUTPATIENT
Start: 2022-11-23 | End: 2022-11-23 | Stop reason: HOSPADM

## 2022-11-22 RX ORDER — LOSARTAN POTASSIUM 50 MG/1
50 TABLET ORAL DAILY
Status: DISCONTINUED | OUTPATIENT
Start: 2022-11-23 | End: 2022-11-23

## 2022-11-22 RX ORDER — NICOTINE POLACRILEX 4 MG
15 LOZENGE BUCCAL PRN
Status: DISCONTINUED | OUTPATIENT
Start: 2022-11-22 | End: 2022-11-23 | Stop reason: HOSPADM

## 2022-11-22 RX ORDER — ATORVASTATIN CALCIUM 80 MG/1
80 TABLET, FILM COATED ORAL DAILY
Status: DISCONTINUED | OUTPATIENT
Start: 2022-11-23 | End: 2022-11-23 | Stop reason: HOSPADM

## 2022-11-22 RX ORDER — ESCITALOPRAM OXALATE 10 MG/1
10 TABLET ORAL DAILY
Status: DISCONTINUED | OUTPATIENT
Start: 2022-11-23 | End: 2022-11-23 | Stop reason: HOSPADM

## 2022-11-22 RX ORDER — SODIUM CHLORIDE 9 MG/ML
INJECTION, SOLUTION INTRAVENOUS CONTINUOUS
Status: DISCONTINUED | OUTPATIENT
Start: 2022-11-22 | End: 2022-11-23

## 2022-11-22 RX ORDER — DEXTROSE MONOHYDRATE 25 G/50ML
12.5 INJECTION, SOLUTION INTRAVENOUS PRN
Status: DISCONTINUED | OUTPATIENT
Start: 2022-11-22 | End: 2022-11-23 | Stop reason: HOSPADM

## 2022-11-22 RX ORDER — ASPIRIN 325 MG
325 TABLET ORAL ONCE
Status: COMPLETED | OUTPATIENT
Start: 2022-11-22 | End: 2022-11-22

## 2022-11-22 RX ADMIN — ASPIRIN 325 MG ORAL TABLET 325 MG: 325 PILL ORAL at 23:29

## 2022-11-22 RX ADMIN — SODIUM CHLORIDE 1000 ML: 9 INJECTION, SOLUTION INTRAVENOUS at 22:46

## 2022-11-22 ASSESSMENT — ENCOUNTER SYMPTOMS
SORE THROAT: 0
NAUSEA: 0
LIGHT-HEADEDNESS: 0
FEVER: 0
NUMBNESS: 0
VOMITING: 0
CHILLS: 0
COUGH: 0
SHORTNESS OF BREATH: 0
WOUND: 0
DIARRHEA: 0
ABDOMINAL PAIN: 0
DIZZINESS: 0
HEADACHES: 0

## 2022-11-22 ASSESSMENT — LIFESTYLE VARIABLES
DESCRIBE HOW PAIN IMPACTS ON YOUR LIFE: NONE/CAN MANAGE
IS PATIENT ABLE TO COMPLETE ASSESSMENT AT THIS TIME: YES
SMOKING_YEARS: NO
ARE YOU BLIND OR DO YOU HAVE SERIOUS DIFFICULTY SEEING, EVEN WHEN WEARING GLASSES: NO
AUDIT-C TOTAL SCORE: 7
RECENTLY LOST WEIGHT WITHOUT TRYING: 0
HOW MANY STANDARD DRINKS CONTAINING ALCOHOL DO YOU HAVE ON A TYPICAL DAY: 3 OR 4
HOW OFTEN DO YOU HAVE 6 OR MORE DRINKS ON ONE OCCASION: WEEKLY
HAVE YOU BEEN EATING POORLY BECAUSE OF A DECREASED APPETITE: 0
HISTORY OF PROBLEMS WHEN YOU STOP DRINKING ALCOHOL: NO
ARE YOU DEAF OR DO YOU HAVE SERIOUS DIFFICULTY  HEARING: NO
RECENT DECLINE IN ADLS: NO
LAST DRINK YOU HAD: 49 HOURS OR MORE
ALCOHOL_USE_STATUS: UNHEALTHY DRINKING IDENTIFIED. AUDIT C: 3 OR MORE FOR WOMEN AND 4 OR MORE FOR MEN.
CHRONIC/CANCER PAIN PRESENT: YES, CHRONIC
HOW OFTEN DO YOU HAVE A DRINK CONTAINING ALCOHOL: 2 TO 3 TIMES PER WEEK
PRIOR ALCOHOL TREATMENT: NO
ADL BEFORE ADMISSION: INDEPENDENT
SHORT OF BREATH OR FATIGUE WITH ADLS: NO
ADL NEEDS ASSIST: NO

## 2022-11-22 ASSESSMENT — ORIENTATION MEMORY CONCENTRATION TEST (OMCT)
SAY THE MONTHS IN REVERSE ORDER STARTING WITH LAST MONTH: CORRECT
WHAT MONTH IS IT NOW: CORRECT
OMCT INTERPRETATION: 0-6: NO SIGNIFICANT IMPAIRMENT
REPEAT THE NAME AND ADDRESS I ASKED YOU TO REMEMBER: CORRECT
WHAT TIME IS IT (NO WATCH OR CLOCK): CORRECT
WHAT YEAR IS IT NOW (MUST BE EXACT): CORRECT
OMCT SCORE: 0
COUNT BACKWARDS FROM 20 TO 1: CORRECT

## 2022-11-22 ASSESSMENT — MOVEMENT AND STRENGTH ASSESSMENTS: ALL_EXTREMITIES: EQUAL STRENGTH/TONE/MOVEMENT

## 2022-11-22 ASSESSMENT — PATIENT HEALTH QUESTIONNAIRE - PHQ9
SUM OF ALL RESPONSES TO PHQ9 QUESTIONS 1 AND 2: 0
CLINICAL INTERPRETATION OF PHQ2 SCORE: NO FURTHER SCREENING NEEDED
IS PATIENT ABLE TO COMPLETE PHQ2 OR PHQ9: YES

## 2022-11-22 ASSESSMENT — HEART SCORE
TROPONIN: EQUAL OR LESS THAN NORMAL LIMIT
RISK FACTORS: 1-2 RISK FACTORS
AGE: EQUAL OR GREATER THAN 65
HEART SCORE: 4
EKG: NORMAL
HISTORY: MODERATELY SUSPICIOUS

## 2022-11-22 ASSESSMENT — PAIN DESCRIPTION - PAIN TYPE: TYPE: CHRONIC PAIN

## 2022-11-22 ASSESSMENT — ACTIVITIES OF DAILY LIVING (ADL)
SENSORY_SUPPORT_DEVICES: EYEGLASSES
ADL_SCORE: 12

## 2022-11-22 ASSESSMENT — PAIN SCALES - GENERAL: PAINLEVEL_OUTOF10: 5

## 2022-11-23 ENCOUNTER — HOSPITAL ENCOUNTER (OUTPATIENT)
Dept: CARDIOLOGY | Age: 67
Setting detail: OBSERVATION
Discharge: HOME | End: 2022-11-23
Attending: INTERNAL MEDICINE

## 2022-11-23 ENCOUNTER — APPOINTMENT (OUTPATIENT)
Dept: CARDIOLOGY | Age: 67
End: 2022-11-23
Attending: INTERNAL MEDICINE

## 2022-11-23 ENCOUNTER — APPOINTMENT (OUTPATIENT)
Dept: ULTRASOUND IMAGING | Age: 67
End: 2022-11-23
Attending: INTERNAL MEDICINE

## 2022-11-23 VITALS
BODY MASS INDEX: 25.26 KG/M2 | TEMPERATURE: 97.9 F | WEIGHT: 166.67 LBS | HEART RATE: 59 BPM | SYSTOLIC BLOOD PRESSURE: 144 MMHG | HEIGHT: 68 IN | DIASTOLIC BLOOD PRESSURE: 69 MMHG | OXYGEN SATURATION: 98 % | RESPIRATION RATE: 16 BRPM

## 2022-11-23 LAB
ANION GAP SERPL CALC-SCNC: 15 MMOL/L (ref 7–19)
ATRIAL RATE (BPM): 66
BUN SERPL-MCNC: 24 MG/DL (ref 6–20)
BUN/CREAT SERPL: 28 (ref 7–25)
CALCIUM SERPL-MCNC: 8.1 MG/DL (ref 8.4–10.2)
CHLORIDE SERPL-SCNC: 108 MMOL/L (ref 97–110)
CHOLEST SERPL-MCNC: 164 MG/DL
CHOLEST/HDLC SERPL: 3.2 {RATIO}
CO2 SERPL-SCNC: 22 MMOL/L (ref 21–32)
CREAT SERPL-MCNC: 0.86 MG/DL (ref 0.67–1.17)
DEPRECATED RDW RBC: 50.1 FL (ref 39–50)
ERYTHROCYTE [DISTWIDTH] IN BLOOD: 13.8 % (ref 11–15)
FASTING DURATION TIME PATIENT: ABNORMAL H
FASTING DURATION TIME PATIENT: ABNORMAL H
GFR SERPLBLD BASED ON 1.73 SQ M-ARVRAT: >90 ML/MIN
GLUCOSE BLDC GLUCOMTR-MCNC: 111 MG/DL (ref 70–99)
GLUCOSE BLDC GLUCOMTR-MCNC: 141 MG/DL (ref 70–99)
GLUCOSE SERPL-MCNC: 114 MG/DL (ref 70–99)
HCT VFR BLD CALC: 44 % (ref 39–51)
HDLC SERPL-MCNC: 52 MG/DL
HGB BLD-MCNC: 14 G/DL (ref 13–17)
LDLC SERPL CALC-MCNC: 74 MG/DL
LV EF: NORMAL %
MCH RBC QN AUTO: 31.3 PG (ref 26–34)
MCHC RBC AUTO-ENTMCNC: 31.8 G/DL (ref 32–36.5)
MCV RBC AUTO: 98.4 FL (ref 78–100)
NONHDLC SERPL-MCNC: 112 MG/DL
NRBC BLD MANUAL-RTO: 0 /100 WBC
P AXIS (DEGREES): 57
PLATELET # BLD AUTO: 183 K/MCL (ref 140–450)
POTASSIUM SERPL-SCNC: 4.5 MMOL/L (ref 3.4–5.1)
PR-INTERVAL (MSEC): 180
QRS-INTERVAL (MSEC): 72
QT-INTERVAL (MSEC): 396
QTC: 415
R AXIS (DEGREES): 72
RBC # BLD: 4.47 MIL/MCL (ref 4.5–5.9)
REPORT TEXT: NORMAL
SODIUM SERPL-SCNC: 140 MMOL/L (ref 135–145)
STRESS TARGET HR: 154 BPM
T AXIS (DEGREES): 75
TRIGL SERPL-MCNC: 188 MG/DL
TROPONIN I SERPL DL<=0.01 NG/ML-MCNC: 10 NG/L
TROPONIN I SERPL DL<=0.01 NG/ML-MCNC: 11 NG/L
TSH SERPL-ACNC: 1.06 MCUNITS/ML (ref 0.35–5)
VENTRICULAR RATE EKG/MIN (BPM): 66
WBC # BLD: 9.3 K/MCL (ref 4.2–11)

## 2022-11-23 PROCEDURE — 3078F DIAST BP <80 MM HG: CPT | Performed by: INTERNAL MEDICINE

## 2022-11-23 PROCEDURE — 96372 THER/PROPH/DIAG INJ SC/IM: CPT

## 2022-11-23 PROCEDURE — 85027 COMPLETE CBC AUTOMATED: CPT | Performed by: INTERNAL MEDICINE

## 2022-11-23 PROCEDURE — 10002800 HB RX 250 W HCPCS: Performed by: INTERNAL MEDICINE

## 2022-11-23 PROCEDURE — G0378 HOSPITAL OBSERVATION PER HR: HCPCS

## 2022-11-23 PROCEDURE — 36415 COLL VENOUS BLD VENIPUNCTURE: CPT | Performed by: INTERNAL MEDICINE

## 2022-11-23 PROCEDURE — 99220 INITIAL OBSERVATION CARE,LEVL III: CPT | Performed by: INTERNAL MEDICINE

## 2022-11-23 PROCEDURE — 96361 HYDRATE IV INFUSION ADD-ON: CPT

## 2022-11-23 PROCEDURE — 93351 STRESS TTE COMPLETE: CPT | Performed by: INTERNAL MEDICINE

## 2022-11-23 PROCEDURE — 84484 ASSAY OF TROPONIN QUANT: CPT | Performed by: INTERNAL MEDICINE

## 2022-11-23 PROCEDURE — 80061 LIPID PANEL: CPT | Performed by: INTERNAL MEDICINE

## 2022-11-23 PROCEDURE — 10002807 HB RX 258: Performed by: INTERNAL MEDICINE

## 2022-11-23 PROCEDURE — 10004651 HB RX, NO CHARGE ITEM: Performed by: INTERNAL MEDICINE

## 2022-11-23 PROCEDURE — 76775 US EXAM ABDO BACK WALL LIM: CPT

## 2022-11-23 PROCEDURE — 82962 GLUCOSE BLOOD TEST: CPT

## 2022-11-23 PROCEDURE — 93351 STRESS TTE COMPLETE: CPT

## 2022-11-23 PROCEDURE — 3077F SYST BP >= 140 MM HG: CPT | Performed by: INTERNAL MEDICINE

## 2022-11-23 PROCEDURE — 84443 ASSAY THYROID STIM HORMONE: CPT | Performed by: INTERNAL MEDICINE

## 2022-11-23 PROCEDURE — 10002803 HB RX 637: Performed by: INTERNAL MEDICINE

## 2022-11-23 PROCEDURE — 99244 OFF/OP CNSLTJ NEW/EST MOD 40: CPT | Performed by: INTERNAL MEDICINE

## 2022-11-23 PROCEDURE — 80048 BASIC METABOLIC PNL TOTAL CA: CPT | Performed by: INTERNAL MEDICINE

## 2022-11-23 RX ORDER — NICOTINE 21 MG/24HR
1 PATCH, TRANSDERMAL 24 HOURS TRANSDERMAL DAILY
Status: DISCONTINUED | OUTPATIENT
Start: 2022-11-23 | End: 2022-11-23 | Stop reason: HOSPADM

## 2022-11-23 RX ORDER — LOSARTAN POTASSIUM 50 MG/1
100 TABLET ORAL DAILY
Status: DISCONTINUED | OUTPATIENT
Start: 2022-11-24 | End: 2022-11-23 | Stop reason: HOSPADM

## 2022-11-23 RX ADMIN — METFORMIN HYDROCHLORIDE 500 MG: 500 TABLET ORAL at 08:28

## 2022-11-23 RX ADMIN — LOSARTAN POTASSIUM 50 MG: 50 TABLET, FILM COATED ORAL at 08:28

## 2022-11-23 RX ADMIN — SODIUM CHLORIDE: 9 INJECTION, SOLUTION INTRAVENOUS at 01:08

## 2022-11-23 RX ADMIN — ATORVASTATIN CALCIUM 80 MG: 80 TABLET, FILM COATED ORAL at 08:28

## 2022-11-23 RX ADMIN — ASPIRIN 81 MG: 81 TABLET, COATED ORAL at 08:28

## 2022-11-23 RX ADMIN — ESCITALOPRAM OXALATE 10 MG: 10 TABLET ORAL at 08:28

## 2022-11-23 RX ADMIN — ENOXAPARIN SODIUM 30 MG: 30 INJECTION SUBCUTANEOUS at 08:28

## 2022-11-23 ASSESSMENT — ENCOUNTER SYMPTOMS
GASTROINTESTINAL NEGATIVE: 1
PSYCHIATRIC NEGATIVE: 1
EYES NEGATIVE: 1
ALLERGIC/IMMUNOLOGIC NEGATIVE: 1
CONSTITUTIONAL NEGATIVE: 1
RESPIRATORY NEGATIVE: 1
NEUROLOGICAL NEGATIVE: 1
HEMATOLOGIC/LYMPHATIC NEGATIVE: 1
ENDOCRINE NEGATIVE: 1

## 2023-01-02 PROCEDURE — 93272 ECG/REVIEW INTERPRET ONLY: CPT | Performed by: INTERNAL MEDICINE

## 2023-03-06 PROBLEM — R00.2 PALPITATIONS: Status: ACTIVE | Noted: 2023-03-06

## 2023-03-06 PROBLEM — R07.89 ATYPICAL CHEST PAIN: Status: ACTIVE | Noted: 2022-11-22

## 2023-03-06 PROBLEM — Z09 HOSPITAL DISCHARGE FOLLOW-UP: Status: ACTIVE | Noted: 2023-03-06

## 2023-03-06 PROBLEM — I49.3 FREQUENT PVCS: Status: ACTIVE | Noted: 2023-03-06

## 2023-03-06 ASSESSMENT — ENCOUNTER SYMPTOMS
ACTIVITY CHANGE: 0
ABDOMINAL DISTENTION: 0
BRUISES/BLEEDS EASILY: 0
UNEXPECTED WEIGHT CHANGE: 0
SPEECH DIFFICULTY: 0
WHEEZING: 0
NAUSEA: 0
VOMITING: 0
COUGH: 0
DIAPHORESIS: 0
SHORTNESS OF BREATH: 0
DIZZINESS: 0
APPETITE CHANGE: 0
BLOOD IN STOOL: 0
WEAKNESS: 0
LIGHT-HEADEDNESS: 0
CHEST TIGHTNESS: 0
ABDOMINAL PAIN: 0
HEADACHES: 0

## 2023-03-07 ENCOUNTER — OFFICE VISIT (OUTPATIENT)
Dept: CARDIOLOGY | Age: 68
End: 2023-03-07

## 2023-03-07 VITALS
OXYGEN SATURATION: 98 % | HEART RATE: 82 BPM | BODY MASS INDEX: 27.86 KG/M2 | DIASTOLIC BLOOD PRESSURE: 64 MMHG | HEIGHT: 68 IN | WEIGHT: 183.8 LBS | SYSTOLIC BLOOD PRESSURE: 126 MMHG

## 2023-03-07 DIAGNOSIS — R06.09 DOE (DYSPNEA ON EXERTION): ICD-10-CM

## 2023-03-07 DIAGNOSIS — Z72.0 TOBACCO ABUSE: ICD-10-CM

## 2023-03-07 DIAGNOSIS — Z91.89 AT RISK FOR SLEEP APNEA: ICD-10-CM

## 2023-03-07 DIAGNOSIS — R53.82 CHRONIC FATIGUE, UNSPECIFIED: ICD-10-CM

## 2023-03-07 DIAGNOSIS — E78.2 MIXED HYPERLIPIDEMIA: ICD-10-CM

## 2023-03-07 DIAGNOSIS — I73.9 PAD (PERIPHERAL ARTERY DISEASE) (CMD): ICD-10-CM

## 2023-03-07 DIAGNOSIS — I49.3 FREQUENT PVCS: ICD-10-CM

## 2023-03-07 DIAGNOSIS — Z09 HOSPITAL DISCHARGE FOLLOW-UP: ICD-10-CM

## 2023-03-07 DIAGNOSIS — R07.89 ATYPICAL CHEST PAIN: Primary | ICD-10-CM

## 2023-03-07 DIAGNOSIS — I10 PRIMARY HYPERTENSION: ICD-10-CM

## 2023-03-07 DIAGNOSIS — R00.2 PALPITATIONS: ICD-10-CM

## 2023-03-07 PROCEDURE — 3074F SYST BP LT 130 MM HG: CPT | Performed by: NURSE PRACTITIONER

## 2023-03-07 PROCEDURE — 99214 OFFICE O/P EST MOD 30 MIN: CPT | Performed by: NURSE PRACTITIONER

## 2023-03-07 PROCEDURE — 95800 SLP STDY UNATTENDED: CPT | Performed by: NURSE PRACTITIONER

## 2023-03-07 PROCEDURE — 3078F DIAST BP <80 MM HG: CPT | Performed by: NURSE PRACTITIONER

## 2023-03-07 ASSESSMENT — PATIENT HEALTH QUESTIONNAIRE - PHQ9
1. LITTLE INTEREST OR PLEASURE IN DOING THINGS: NOT AT ALL
SUM OF ALL RESPONSES TO PHQ9 QUESTIONS 1 AND 2: 0
CLINICAL INTERPRETATION OF PHQ2 SCORE: NO FURTHER SCREENING NEEDED
2. FEELING DOWN, DEPRESSED OR HOPELESS: NOT AT ALL
SUM OF ALL RESPONSES TO PHQ9 QUESTIONS 1 AND 2: 0

## 2023-03-07 ASSESSMENT — ENCOUNTER SYMPTOMS: FATIGUE: 1

## 2023-03-07 ASSESSMENT — PAIN SCALES - GENERAL: PAINLEVEL: 0

## 2023-03-20 ENCOUNTER — ANCILLARY PROCEDURE (OUTPATIENT)
Dept: CARDIOLOGY | Age: 68
End: 2023-03-20

## 2023-03-20 ENCOUNTER — OFF PREMISE (OUTPATIENT)
Dept: CARDIOLOGY | Age: 68
End: 2023-03-20

## 2023-03-20 DIAGNOSIS — Z91.89 AT RISK FOR SLEEP APNEA: ICD-10-CM

## 2023-03-20 PROCEDURE — 95800 SLP STDY UNATTENDED: CPT | Performed by: SPECIALIST

## 2023-03-24 ENCOUNTER — TELEPHONE (OUTPATIENT)
Dept: CARDIOLOGY | Age: 68
End: 2023-03-24

## 2023-03-30 ENCOUNTER — OFFICE VISIT (OUTPATIENT)
Dept: SLEEP MEDICINE | Age: 68
End: 2023-03-30
Attending: NURSE PRACTITIONER

## 2023-03-30 VITALS
OXYGEN SATURATION: 98 % | SYSTOLIC BLOOD PRESSURE: 132 MMHG | DIASTOLIC BLOOD PRESSURE: 82 MMHG | WEIGHT: 184.08 LBS | HEART RATE: 83 BPM | HEIGHT: 68 IN | BODY MASS INDEX: 27.9 KG/M2

## 2023-03-30 DIAGNOSIS — R00.2 PALPITATIONS: ICD-10-CM

## 2023-03-30 DIAGNOSIS — Z91.89 AT RISK FOR OBSTRUCTIVE SLEEP APNEA: Primary | ICD-10-CM

## 2023-03-30 DIAGNOSIS — E11.9 CONTROLLED TYPE 2 DIABETES MELLITUS WITHOUT COMPLICATION, WITHOUT LONG-TERM CURRENT USE OF INSULIN (CMD): ICD-10-CM

## 2023-03-30 DIAGNOSIS — I10 PRIMARY HYPERTENSION: ICD-10-CM

## 2023-03-30 DIAGNOSIS — I49.3 FREQUENT PVCS: ICD-10-CM

## 2023-03-30 DIAGNOSIS — E78.2 MIXED HYPERLIPIDEMIA: ICD-10-CM

## 2023-03-30 DIAGNOSIS — R53.82 CHRONIC FATIGUE, UNSPECIFIED: ICD-10-CM

## 2023-03-30 DIAGNOSIS — R06.83 SNORING: ICD-10-CM

## 2023-03-30 PROCEDURE — 3075F SYST BP GE 130 - 139MM HG: CPT | Performed by: NURSE PRACTITIONER

## 2023-03-30 PROCEDURE — 3079F DIAST BP 80-89 MM HG: CPT | Performed by: NURSE PRACTITIONER

## 2023-03-30 PROCEDURE — 99203 OFFICE O/P NEW LOW 30 MIN: CPT | Performed by: NURSE PRACTITIONER

## 2023-03-30 RX ORDER — FLUTICASONE PROPIONATE 50 MCG
SPRAY, SUSPENSION (ML) NASAL
COMMUNITY
Start: 2023-03-24

## 2023-03-30 RX ORDER — BLOOD SUGAR DIAGNOSTIC
STRIP MISCELLANEOUS
COMMUNITY
Start: 2023-03-24

## 2023-03-30 RX ORDER — CLARITHROMYCIN 500 MG/1
TABLET, COATED ORAL
COMMUNITY
Start: 2023-03-28

## 2023-03-30 RX ORDER — CETIRIZINE HYDROCHLORIDE 10 MG/1
TABLET ORAL
COMMUNITY
Start: 2023-03-28

## 2023-03-30 RX ORDER — GLIPIZIDE 5 MG/1
TABLET, FILM COATED, EXTENDED RELEASE ORAL
COMMUNITY
Start: 2023-03-24 | End: 2023-04-03

## 2023-03-30 RX ORDER — LANCETS 30 GAUGE
EACH MISCELLANEOUS
COMMUNITY
Start: 2023-03-24

## 2023-03-30 ASSESSMENT — SLEEP AND FATIGUE QUESTIONNAIRES
HOW LIKELY ARE YOU TO NOD OFF OR FALL ASLEEP IN A CAR, WHILE STOPPED FOR A FEW MINUTES IN TRAFFIC: WOULD NEVER DOZE
HOW LIKELY ARE YOU TO NOD OFF OR FALL ASLEEP WHILE SITTING AND READING: HIGH CHANCE OF DOZING
ESS TOTAL SCORE: 6
HOW LIKELY ARE YOU TO NOD OFF OR FALL ASLEEP WHILE SITTING QUIETLY AFTER LUNCH WITHOUT ALCOHOL: WOULD NEVER DOZE
HOW LIKELY ARE YOU TO NOD OFF OR FALL ASLEEP WHILE SITTING AND TALKING TO SOMEONE: WOULD NEVER DOZE
HOW LIKELY ARE YOU TO NOD OFF OR FALL ASLEEP WHEN YOU ARE A PASSENGER IN A CAR FOR AN HOUR WITHOUT A BREAK: WOULD NEVER DOZE
HOW LIKELY ARE YOU TO NOD OFF OR FALL ASLEEP WHILE LYING DOWN TO REST IN THE AFTERNOON WHEN CIRCUMSTANCES PERMIT: WOULD NEVER DOZE
HOW LIKELY ARE YOU TO NOD OFF OR FALL ASLEEP WHILE WATCHING TV: HIGH CHANCE OF DOZING
HOW LIKELY ARE YOU TO NOD OFF OR FALL ASLEEP WHILE SITTING INACTIVE IN A PUBLIC PLACE: WOULD NEVER DOZE

## 2023-03-30 ASSESSMENT — ENCOUNTER SYMPTOMS
WEIGHT LOSS: 0
VOMITING: 0
NUMBNESS: 0
CHILLS: 0
PARESTHESIAS: 0
PHOTOPHOBIA: 0
SORE THROAT: 0
NAUSEA: 0
EXCESSIVE DAYTIME SLEEPINESS: 1
BLURRED VISION: 0
SLEEP DISTURBANCES DUE TO BREATHING: 1
FEVER: 0
SNORING: 1
DIARRHEA: 0

## 2023-04-03 ENCOUNTER — OFFICE VISIT (OUTPATIENT)
Dept: CARDIOLOGY | Age: 68
End: 2023-04-03

## 2023-04-03 VITALS
HEART RATE: 68 BPM | HEIGHT: 68 IN | WEIGHT: 182.98 LBS | SYSTOLIC BLOOD PRESSURE: 144 MMHG | BODY MASS INDEX: 27.73 KG/M2 | DIASTOLIC BLOOD PRESSURE: 76 MMHG

## 2023-04-03 DIAGNOSIS — I49.3 FREQUENT PVCS: ICD-10-CM

## 2023-04-03 DIAGNOSIS — E78.2 MIXED HYPERLIPIDEMIA: ICD-10-CM

## 2023-04-03 DIAGNOSIS — I10 PRIMARY HYPERTENSION: ICD-10-CM

## 2023-04-03 DIAGNOSIS — I73.9 CLAUDICATION IN PERIPHERAL VASCULAR DISEASE (CMD): ICD-10-CM

## 2023-04-03 DIAGNOSIS — I73.9 PAD (PERIPHERAL ARTERY DISEASE) (CMD): Primary | ICD-10-CM

## 2023-04-03 PROCEDURE — 99215 OFFICE O/P EST HI 40 MIN: CPT | Performed by: INTERNAL MEDICINE

## 2023-04-03 PROCEDURE — 3078F DIAST BP <80 MM HG: CPT | Performed by: INTERNAL MEDICINE

## 2023-04-03 PROCEDURE — 3077F SYST BP >= 140 MM HG: CPT | Performed by: INTERNAL MEDICINE

## 2023-04-03 ASSESSMENT — ENCOUNTER SYMPTOMS
BACK PAIN: 1
WEAKNESS: 1

## 2023-04-10 ENCOUNTER — TELEPHONE (OUTPATIENT)
Dept: CARDIOLOGY | Age: 68
End: 2023-04-10

## 2023-04-10 DIAGNOSIS — I73.9 PAD (PERIPHERAL ARTERY DISEASE) (CMD): Primary | ICD-10-CM

## 2023-04-11 ENCOUNTER — APPOINTMENT (OUTPATIENT)
Dept: SLEEP MEDICINE | Age: 68
End: 2023-04-11

## 2023-04-13 ENCOUNTER — HOSPITAL ENCOUNTER (OUTPATIENT)
Dept: RESPIRATORY THERAPY | Age: 68
Discharge: HOME | End: 2023-04-13
Attending: NURSE PRACTITIONER

## 2023-04-13 DIAGNOSIS — R06.09 DOE (DYSPNEA ON EXERTION): Primary | ICD-10-CM

## 2023-04-13 DIAGNOSIS — R06.09 DOE (DYSPNEA ON EXERTION): ICD-10-CM

## 2023-04-13 PROCEDURE — 94726 PLETHYSMOGRAPHY LUNG VOLUMES: CPT

## 2023-04-13 PROCEDURE — 94729 DIFFUSING CAPACITY: CPT

## 2023-04-13 PROCEDURE — 94726 PLETHYSMOGRAPHY LUNG VOLUMES: CPT | Performed by: INTERNAL MEDICINE

## 2023-04-13 PROCEDURE — 94729 DIFFUSING CAPACITY: CPT | Performed by: INTERNAL MEDICINE

## 2023-04-13 PROCEDURE — 94060 EVALUATION OF WHEEZING: CPT | Performed by: INTERNAL MEDICINE

## 2023-04-13 PROCEDURE — 94640 AIRWAY INHALATION TREATMENT: CPT

## 2023-04-13 PROCEDURE — 94060 EVALUATION OF WHEEZING: CPT

## 2023-04-13 RX ORDER — ALBUTEROL SULFATE 2.5 MG/3ML
SOLUTION RESPIRATORY (INHALATION)
Status: DISPENSED
Start: 2023-04-13 | End: 2023-04-13

## 2023-04-24 ENCOUNTER — TELEPHONE (OUTPATIENT)
Dept: CARDIOLOGY | Age: 68
End: 2023-04-24

## 2023-04-24 ENCOUNTER — LAB SERVICES (OUTPATIENT)
Dept: LAB | Age: 68
End: 2023-04-24

## 2023-04-24 DIAGNOSIS — I73.9 PAD (PERIPHERAL ARTERY DISEASE) (CMD): ICD-10-CM

## 2023-04-24 LAB
ANION GAP SERPL CALC-SCNC: 13 MMOL/L (ref 7–19)
BUN SERPL-MCNC: 15 MG/DL (ref 6–20)
BUN/CREAT SERPL: 19 (ref 7–25)
CALCIUM SERPL-MCNC: 9.1 MG/DL (ref 8.4–10.2)
CHLORIDE SERPL-SCNC: 107 MMOL/L (ref 97–110)
CO2 SERPL-SCNC: 28 MMOL/L (ref 21–32)
CREAT SERPL-MCNC: 0.77 MG/DL (ref 0.67–1.17)
FASTING DURATION TIME PATIENT: ABNORMAL H
GFR SERPLBLD BASED ON 1.73 SQ M-ARVRAT: >90 ML/MIN
GLUCOSE SERPL-MCNC: 122 MG/DL (ref 70–99)
POTASSIUM SERPL-SCNC: 4.8 MMOL/L (ref 3.4–5.1)
SODIUM SERPL-SCNC: 143 MMOL/L (ref 135–145)

## 2023-04-24 PROCEDURE — 36415 COLL VENOUS BLD VENIPUNCTURE: CPT | Performed by: INTERNAL MEDICINE

## 2023-04-24 PROCEDURE — 80048 BASIC METABOLIC PNL TOTAL CA: CPT | Performed by: INTERNAL MEDICINE

## 2023-04-28 ENCOUNTER — LAB SERVICES (OUTPATIENT)
Dept: LAB | Age: 68
End: 2023-04-28

## 2023-04-28 DIAGNOSIS — Z01.812 PRE-PROCEDURE LAB EXAM: Primary | ICD-10-CM

## 2023-04-28 PROCEDURE — U0005 INFEC AGEN DETEC AMPLI PROBE: HCPCS | Performed by: INTERNAL MEDICINE

## 2023-04-28 PROCEDURE — U0003 INFECTIOUS AGENT DETECTION BY NUCLEIC ACID (DNA OR RNA); SEVERE ACUTE RESPIRATORY SYNDROME CORONAVIRUS 2 (SARS-COV-2) (CORONAVIRUS DISEASE [COVID-19]), AMPLIFIED PROBE TECHNIQUE, MAKING USE OF HIGH THROUGHPUT TECHNOLOGIES AS DESCRIBED BY CMS-2020-01-R: HCPCS | Performed by: INTERNAL MEDICINE

## 2023-04-29 ENCOUNTER — HOSPITAL ENCOUNTER (OUTPATIENT)
Dept: CT IMAGING | Age: 68
Discharge: HOME | End: 2023-04-29
Attending: INTERNAL MEDICINE

## 2023-04-29 DIAGNOSIS — I73.9 CLAUDICATION IN PERIPHERAL VASCULAR DISEASE (CMD): ICD-10-CM

## 2023-04-29 DIAGNOSIS — I73.9 PAD (PERIPHERAL ARTERY DISEASE) (CMD): ICD-10-CM

## 2023-04-29 LAB
SARS-COV-2 RNA RESP QL NAA+PROBE: NOT DETECTED
SERVICE CMNT-IMP: NORMAL
SERVICE CMNT-IMP: NORMAL

## 2023-04-29 PROCEDURE — 75635 CT ANGIO ABDOMINAL ARTERIES: CPT

## 2023-04-29 PROCEDURE — 10002805 HB CONTRAST AGENT: Performed by: INTERNAL MEDICINE

## 2023-04-29 PROCEDURE — G1004 CDSM NDSC: HCPCS

## 2023-04-29 RX ADMIN — IOHEXOL 120 ML: 350 INJECTION, SOLUTION INTRAVENOUS at 10:48

## 2023-04-30 ENCOUNTER — OFFICE VISIT (OUTPATIENT)
Dept: SLEEP MEDICINE | Age: 68
End: 2023-04-30
Attending: NURSE PRACTITIONER

## 2023-04-30 DIAGNOSIS — R00.2 PALPITATIONS: ICD-10-CM

## 2023-04-30 DIAGNOSIS — Z91.89 AT RISK FOR OBSTRUCTIVE SLEEP APNEA: ICD-10-CM

## 2023-04-30 DIAGNOSIS — R06.83 SNORING: ICD-10-CM

## 2023-04-30 DIAGNOSIS — I10 PRIMARY HYPERTENSION: ICD-10-CM

## 2023-04-30 DIAGNOSIS — E11.9 CONTROLLED TYPE 2 DIABETES MELLITUS WITHOUT COMPLICATION, WITHOUT LONG-TERM CURRENT USE OF INSULIN (CMD): ICD-10-CM

## 2023-04-30 DIAGNOSIS — G47.33 OSA (OBSTRUCTIVE SLEEP APNEA): Primary | ICD-10-CM

## 2023-04-30 DIAGNOSIS — E78.2 MIXED HYPERLIPIDEMIA: ICD-10-CM

## 2023-04-30 DIAGNOSIS — R53.82 CHRONIC FATIGUE, UNSPECIFIED: ICD-10-CM

## 2023-04-30 DIAGNOSIS — I49.3 FREQUENT PVCS: ICD-10-CM

## 2023-04-30 LAB — REPORT TEXT: NORMAL

## 2023-04-30 PROCEDURE — 95810 POLYSOM 6/> YRS 4/> PARAM: CPT | Performed by: SPECIALIST

## 2023-05-04 ENCOUNTER — TELEPHONE (OUTPATIENT)
Dept: CARDIOLOGY | Age: 68
End: 2023-05-04

## 2023-05-09 ENCOUNTER — OFFICE VISIT (OUTPATIENT)
Dept: SLEEP MEDICINE | Age: 68
End: 2023-05-09

## 2023-05-09 VITALS
DIASTOLIC BLOOD PRESSURE: 82 MMHG | OXYGEN SATURATION: 98 % | SYSTOLIC BLOOD PRESSURE: 140 MMHG | BODY MASS INDEX: 27.1 KG/M2 | WEIGHT: 178.79 LBS | HEART RATE: 78 BPM | HEIGHT: 68 IN

## 2023-05-09 DIAGNOSIS — G47.61 PLMD (PERIODIC LIMB MOVEMENT DISORDER): Primary | ICD-10-CM

## 2023-05-09 DIAGNOSIS — R06.83 SNORING: ICD-10-CM

## 2023-05-09 PROCEDURE — 3077F SYST BP >= 140 MM HG: CPT | Performed by: NURSE PRACTITIONER

## 2023-05-09 PROCEDURE — 3079F DIAST BP 80-89 MM HG: CPT | Performed by: NURSE PRACTITIONER

## 2023-05-09 PROCEDURE — 99213 OFFICE O/P EST LOW 20 MIN: CPT | Performed by: NURSE PRACTITIONER

## 2023-05-09 RX ORDER — ERGOCALCIFEROL 1.25 MG/1
CAPSULE ORAL
COMMUNITY
Start: 2023-04-22

## 2023-05-09 RX ORDER — NICOTINE 21 MG/24HR
PATCH, TRANSDERMAL 24 HOURS TRANSDERMAL
COMMUNITY
Start: 2023-04-24

## 2023-05-09 RX ORDER — NEOMYCIN SULFATE, POLYMYXIN B SULFATE, AND DEXAMETHASONE 3.5; 10000; 1 MG/G; [USP'U]/G; MG/G
OINTMENT OPHTHALMIC
COMMUNITY
Start: 2023-04-28

## 2023-05-09 RX ORDER — GLIPIZIDE 5 MG/1
TABLET, FILM COATED, EXTENDED RELEASE ORAL
COMMUNITY
Start: 2023-04-22

## 2023-05-09 ASSESSMENT — SLEEP AND FATIGUE QUESTIONNAIRES
HOW LIKELY ARE YOU TO NOD OFF OR FALL ASLEEP WHEN YOU ARE A PASSENGER IN A CAR FOR AN HOUR WITHOUT A BREAK: SLIGHT CHANCE OF DOZING
ESS TOTAL SCORE: 8
HOW LIKELY ARE YOU TO NOD OFF OR FALL ASLEEP WHILE SITTING AND TALKING TO SOMEONE: WOULD NEVER DOZE
HOW LIKELY ARE YOU TO NOD OFF OR FALL ASLEEP WHILE WATCHING TV: MODERATE CHANCE OF DOZING
HOW LIKELY ARE YOU TO NOD OFF OR FALL ASLEEP WHILE SITTING QUIETLY AFTER LUNCH WITHOUT ALCOHOL: MODERATE CHANCE OF DOZING
HOW LIKELY ARE YOU TO NOD OFF OR FALL ASLEEP WHILE SITTING AND READING: WOULD NEVER DOZE
HOW LIKELY ARE YOU TO NOD OFF OR FALL ASLEEP WHILE SITTING INACTIVE IN A PUBLIC PLACE: WOULD NEVER DOZE
HOW LIKELY ARE YOU TO NOD OFF OR FALL ASLEEP IN A CAR, WHILE STOPPED FOR A FEW MINUTES IN TRAFFIC: WOULD NEVER DOZE
HOW LIKELY ARE YOU TO NOD OFF OR FALL ASLEEP WHILE LYING DOWN TO REST IN THE AFTERNOON WHEN CIRCUMSTANCES PERMIT: HIGH CHANCE OF DOZING

## 2023-05-10 ASSESSMENT — ENCOUNTER SYMPTOMS
BLURRED VISION: 0
SLEEP DISTURBANCES DUE TO BREATHING: 0
FEVER: 0
PHOTOPHOBIA: 0
NUMBNESS: 0
SNORING: 1
SORE THROAT: 0
WEIGHT LOSS: 0
VOMITING: 0
NAUSEA: 0
EXCESSIVE DAYTIME SLEEPINESS: 1
PARESTHESIAS: 0
DIARRHEA: 0
CHILLS: 0

## 2023-05-15 ENCOUNTER — TELEPHONE (OUTPATIENT)
Dept: SLEEP MEDICINE | Age: 68
End: 2023-05-15

## 2023-05-20 ENCOUNTER — APPOINTMENT (OUTPATIENT)
Dept: GENERAL RADIOLOGY | Age: 68
End: 2023-05-20
Attending: STUDENT IN AN ORGANIZED HEALTH CARE EDUCATION/TRAINING PROGRAM

## 2023-05-20 ENCOUNTER — HOSPITAL ENCOUNTER (EMERGENCY)
Age: 68
Discharge: HOME | End: 2023-05-20
Attending: EMERGENCY MEDICINE

## 2023-05-20 VITALS
HEART RATE: 66 BPM | BODY MASS INDEX: 27.13 KG/M2 | DIASTOLIC BLOOD PRESSURE: 93 MMHG | HEIGHT: 68 IN | SYSTOLIC BLOOD PRESSURE: 177 MMHG | TEMPERATURE: 98.2 F | OXYGEN SATURATION: 100 % | WEIGHT: 179.01 LBS | RESPIRATION RATE: 18 BRPM

## 2023-05-20 DIAGNOSIS — I10 ESSENTIAL HYPERTENSION, BENIGN: ICD-10-CM

## 2023-05-20 DIAGNOSIS — Z86.39 H/O NON-INSULIN DEPENDENT DIABETES MELLITUS: ICD-10-CM

## 2023-05-20 DIAGNOSIS — S29.9XXA INJURY OF CHEST WALL, INITIAL ENCOUNTER: ICD-10-CM

## 2023-05-20 DIAGNOSIS — R07.81 RIB PAIN ON LEFT SIDE: Primary | ICD-10-CM

## 2023-05-20 LAB
ALBUMIN SERPL-MCNC: 3.9 G/DL (ref 3.6–5.1)
ALBUMIN/GLOB SERPL: 1.3 {RATIO} (ref 1–2.4)
ALP SERPL-CCNC: 86 UNITS/L (ref 45–117)
ALT SERPL-CCNC: 38 UNITS/L
ANION GAP SERPL CALC-SCNC: 11 MMOL/L (ref 7–19)
AST SERPL-CCNC: 17 UNITS/L
ATRIAL RATE (BPM): 64
BASOPHILS # BLD: 0.1 K/MCL (ref 0–0.3)
BASOPHILS NFR BLD: 1 %
BILIRUB SERPL-MCNC: 0.8 MG/DL (ref 0.2–1)
BUN SERPL-MCNC: 14 MG/DL (ref 6–20)
BUN/CREAT SERPL: 19 (ref 7–25)
CALCIUM SERPL-MCNC: 8.7 MG/DL (ref 8.4–10.2)
CHLORIDE SERPL-SCNC: 106 MMOL/L (ref 97–110)
CO2 SERPL-SCNC: 27 MMOL/L (ref 21–32)
CREAT SERPL-MCNC: 0.75 MG/DL (ref 0.67–1.17)
DEPRECATED RDW RBC: 50 FL (ref 39–50)
EOSINOPHIL # BLD: 0.1 K/MCL (ref 0–0.5)
EOSINOPHIL NFR BLD: 1 %
ERYTHROCYTE [DISTWIDTH] IN BLOOD: 13.8 % (ref 11–15)
FASTING DURATION TIME PATIENT: ABNORMAL H
GFR SERPLBLD BASED ON 1.73 SQ M-ARVRAT: >90 ML/MIN
GLOBULIN SER-MCNC: 2.9 G/DL (ref 2–4)
GLUCOSE SERPL-MCNC: 136 MG/DL (ref 70–99)
HCT VFR BLD CALC: 46 % (ref 39–51)
HGB BLD-MCNC: 15.1 G/DL (ref 13–17)
IMM GRANULOCYTES # BLD AUTO: 0 K/MCL (ref 0–0.2)
IMM GRANULOCYTES # BLD: 0 %
LYMPHOCYTES # BLD: 1.5 K/MCL (ref 1–4)
LYMPHOCYTES NFR BLD: 15 %
MCH RBC QN AUTO: 32.3 PG (ref 26–34)
MCHC RBC AUTO-ENTMCNC: 32.8 G/DL (ref 32–36.5)
MCV RBC AUTO: 98.5 FL (ref 78–100)
MONOCYTES # BLD: 1.2 K/MCL (ref 0.3–0.9)
MONOCYTES NFR BLD: 12 %
NEUTROPHILS # BLD: 7.2 K/MCL (ref 1.8–7.7)
NEUTROPHILS NFR BLD: 71 %
NRBC BLD MANUAL-RTO: 0 /100 WBC
P AXIS (DEGREES): 7
PLATELET # BLD AUTO: 211 K/MCL (ref 140–450)
POTASSIUM SERPL-SCNC: 4 MMOL/L (ref 3.4–5.1)
PR-INTERVAL (MSEC): 168
PROT SERPL-MCNC: 6.8 G/DL (ref 6.4–8.2)
QRS-INTERVAL (MSEC): 86
QT-INTERVAL (MSEC): 392
QTC: 404
R AXIS (DEGREES): 14
RBC # BLD: 4.67 MIL/MCL (ref 4.5–5.9)
REPORT TEXT: NORMAL
SODIUM SERPL-SCNC: 140 MMOL/L (ref 135–145)
T AXIS (DEGREES): 46
TROPONIN I SERPL DL<=0.01 NG/ML-MCNC: 8 NG/L
VENTRICULAR RATE EKG/MIN (BPM): 64
WBC # BLD: 10.1 K/MCL (ref 4.2–11)

## 2023-05-20 PROCEDURE — 80053 COMPREHEN METABOLIC PANEL: CPT | Performed by: STUDENT IN AN ORGANIZED HEALTH CARE EDUCATION/TRAINING PROGRAM

## 2023-05-20 PROCEDURE — 99285 EMERGENCY DEPT VISIT HI MDM: CPT

## 2023-05-20 PROCEDURE — 85025 COMPLETE CBC W/AUTO DIFF WBC: CPT | Performed by: STUDENT IN AN ORGANIZED HEALTH CARE EDUCATION/TRAINING PROGRAM

## 2023-05-20 PROCEDURE — 93005 ELECTROCARDIOGRAM TRACING: CPT | Performed by: EMERGENCY MEDICINE

## 2023-05-20 PROCEDURE — 84484 ASSAY OF TROPONIN QUANT: CPT | Performed by: STUDENT IN AN ORGANIZED HEALTH CARE EDUCATION/TRAINING PROGRAM

## 2023-05-20 PROCEDURE — 99283 EMERGENCY DEPT VISIT LOW MDM: CPT | Performed by: EMERGENCY MEDICINE

## 2023-05-20 PROCEDURE — 71046 X-RAY EXAM CHEST 2 VIEWS: CPT

## 2023-05-20 PROCEDURE — 93010 ELECTROCARDIOGRAM REPORT: CPT | Performed by: INTERNAL MEDICINE

## 2023-05-20 RX ORDER — NALOXONE HYDROCHLORIDE 4 MG/.1ML
SPRAY NASAL
Qty: 2 EACH | Refills: 1 | Status: SHIPPED | OUTPATIENT
Start: 2023-05-20

## 2023-05-20 RX ORDER — HYDROCODONE BITARTRATE AND ACETAMINOPHEN 10; 325 MG/1; MG/1
1 TABLET ORAL EVERY 8 HOURS PRN
Qty: 20 TABLET | Refills: 0 | Status: SHIPPED | OUTPATIENT
Start: 2023-05-20

## 2023-05-20 ASSESSMENT — HEART SCORE
EKG: NORMAL
TROPONIN: EQUAL OR LESS THAN NORMAL LIMIT
HISTORY: SLIGHTLY SUSPICIOUS
HEART SCORE: 3
RISK FACTORS: 1-2 RISK FACTORS
AGE: EQUAL OR GREATER THAN 65

## 2023-05-20 ASSESSMENT — PAIN SCALES - GENERAL: PAINLEVEL_OUTOF10: 0

## 2023-05-25 NOTE — PROGRESS NOTES
Pt s/p angioplasty to L/R iliac arteries with stent placement to Left. Post procedure R groin site remained CDI with no bleeding/hematoma throughout recovery period including after ambulation/voiding. VSS, denies pain, tolerated PO intake and ambulation in hallway. IV d/c'd. Discharge instructions given to pt. - Including new prescription for plavix-verbalized understanding. Pt to lobby via [address]. Pt's daughter to drive home.
You can access the FollowMyHealth Patient Portal offered by [facility] by registering at the following website: [email or web address]. By joining [facility]’s FollowMyHealth portal, you will also be able to view your health information using other applications (apps) compatible with our system.

## 2023-06-05 ENCOUNTER — APPOINTMENT (OUTPATIENT)
Dept: CARDIOLOGY | Age: 68
End: 2023-06-05

## 2023-06-20 ENCOUNTER — APPOINTMENT (OUTPATIENT)
Dept: SLEEP MEDICINE | Age: 68
End: 2023-06-20

## 2023-07-11 ENCOUNTER — APPOINTMENT (OUTPATIENT)
Dept: SLEEP MEDICINE | Age: 68
End: 2023-07-11

## 2023-08-09 DIAGNOSIS — R51.9 HEADACHE: Primary | ICD-10-CM

## 2023-08-16 ENCOUNTER — HOSPITAL ENCOUNTER (OUTPATIENT)
Dept: MRI IMAGING | Age: 68
Discharge: HOME | End: 2023-08-16
Attending: FAMILY MEDICINE

## 2023-08-16 DIAGNOSIS — R51.9 HEADACHE: ICD-10-CM

## 2023-08-17 ENCOUNTER — HOSPITAL ENCOUNTER (OUTPATIENT)
Dept: CT IMAGING | Age: 68
Discharge: HOME | End: 2023-08-17
Attending: FAMILY MEDICINE

## 2023-08-17 DIAGNOSIS — R31.9 HEMATURIA: ICD-10-CM

## 2023-08-17 DIAGNOSIS — R10.9 ACUTE ABDOMINAL PAIN: ICD-10-CM

## 2023-08-17 LAB
CREAT SERPL-MCNC: 0.9 MG/DL (ref 0.67–1.17)
EGFRCR SERPLBLD CKD-EPI 2021: >90 ML/MIN/{1.73_M2}

## 2023-08-17 PROCEDURE — 82565 ASSAY OF CREATININE: CPT

## 2023-08-17 PROCEDURE — 10002805 HB CONTRAST AGENT

## 2023-08-17 PROCEDURE — 74177 CT ABD & PELVIS W/CONTRAST: CPT

## 2023-08-17 RX ADMIN — IOHEXOL 100 ML: 350 INJECTION, SOLUTION INTRAVENOUS at 15:28

## 2023-08-29 ENCOUNTER — HOSPITAL ENCOUNTER (OUTPATIENT)
Dept: MRI IMAGING | Age: 68
Discharge: HOME | End: 2023-08-29
Attending: FAMILY MEDICINE

## 2023-08-29 PROCEDURE — A9585 GADOBUTROL INJECTION: HCPCS

## 2023-08-29 PROCEDURE — 10002805 HB CONTRAST AGENT

## 2023-08-29 PROCEDURE — 70553 MRI BRAIN STEM W/O & W/DYE: CPT

## 2023-08-29 RX ORDER — GADOBUTROL 604.72 MG/ML
7.5 INJECTION INTRAVENOUS ONCE
Status: COMPLETED | OUTPATIENT
Start: 2023-08-29 | End: 2023-08-29

## 2023-08-29 RX ADMIN — GADOBUTROL 7.5 ML: 604.72 INJECTION INTRAVENOUS at 12:14

## 2024-01-30 ENCOUNTER — TELEPHONE (OUTPATIENT)
Dept: FAMILY MEDICINE | Age: 69
End: 2024-01-30

## 2024-02-19 ENCOUNTER — APPOINTMENT (OUTPATIENT)
Dept: INTERNAL MEDICINE | Age: 69
End: 2024-02-19

## 2024-02-19 VITALS
OXYGEN SATURATION: 97 % | DIASTOLIC BLOOD PRESSURE: 78 MMHG | TEMPERATURE: 98.4 F | SYSTOLIC BLOOD PRESSURE: 162 MMHG | BODY MASS INDEX: 28.15 KG/M2 | WEIGHT: 185.74 LBS | HEART RATE: 69 BPM | RESPIRATION RATE: 18 BRPM | HEIGHT: 68 IN

## 2024-02-19 DIAGNOSIS — F17.200 SMOKING ADDICTION: ICD-10-CM

## 2024-02-19 DIAGNOSIS — E11.9 CONTROLLED TYPE 2 DIABETES MELLITUS WITHOUT COMPLICATION, WITHOUT LONG-TERM CURRENT USE OF INSULIN (CMD): Primary | ICD-10-CM

## 2024-02-19 DIAGNOSIS — J30.89 SEASONAL ALLERGIC RHINITIS DUE TO OTHER ALLERGIC TRIGGER: ICD-10-CM

## 2024-02-19 DIAGNOSIS — Z00.00 HEALTHCARE MAINTENANCE: ICD-10-CM

## 2024-02-19 DIAGNOSIS — Z12.11 COLON CANCER SCREENING: ICD-10-CM

## 2024-02-19 DIAGNOSIS — E55.9 VITAMIN D DEFICIENCY, UNSPECIFIED: ICD-10-CM

## 2024-02-19 DIAGNOSIS — F41.1 GENERALIZED ANXIETY DISORDER: ICD-10-CM

## 2024-02-19 DIAGNOSIS — I10 ESSENTIAL HYPERTENSION: ICD-10-CM

## 2024-02-19 DIAGNOSIS — E78.2 MIXED HYPERLIPIDEMIA: ICD-10-CM

## 2024-02-19 PROCEDURE — 99204 OFFICE O/P NEW MOD 45 MIN: CPT | Performed by: INTERNAL MEDICINE

## 2024-02-19 PROCEDURE — 3078F DIAST BP <80 MM HG: CPT | Performed by: INTERNAL MEDICINE

## 2024-02-19 PROCEDURE — 3077F SYST BP >= 140 MM HG: CPT | Performed by: INTERNAL MEDICINE

## 2024-02-19 RX ORDER — METFORMIN HYDROCHLORIDE 500 MG/1
500 TABLET, EXTENDED RELEASE ORAL
Qty: 90 TABLET | Refills: 1 | Status: SHIPPED | OUTPATIENT
Start: 2024-02-19 | End: 2024-05-19

## 2024-02-19 RX ORDER — ASPIRIN 81 MG/1
81 TABLET ORAL DAILY
Qty: 90 TABLET | Refills: 1 | Status: SHIPPED | OUTPATIENT
Start: 2024-02-19 | End: 2024-05-19

## 2024-02-19 RX ORDER — IBUPROFEN 800 MG/1
800 TABLET ORAL EVERY 6 HOURS PRN
COMMUNITY
Start: 2023-09-20

## 2024-02-19 RX ORDER — ATORVASTATIN CALCIUM 80 MG/1
80 TABLET, FILM COATED ORAL DAILY
Qty: 90 TABLET | Refills: 1 | Status: SHIPPED | OUTPATIENT
Start: 2024-02-19 | End: 2024-05-19

## 2024-02-19 RX ORDER — LORATADINE 10 MG/1
10 TABLET ORAL DAILY
Qty: 15 TABLET | Refills: 0 | Status: SHIPPED | OUTPATIENT
Start: 2024-02-19 | End: 2024-03-05

## 2024-02-19 RX ORDER — LOSARTAN POTASSIUM 50 MG/1
50 TABLET ORAL DAILY
Qty: 90 TABLET | Refills: 1 | Status: SHIPPED | OUTPATIENT
Start: 2024-02-19 | End: 2024-05-19

## 2024-02-19 RX ORDER — ESCITALOPRAM OXALATE 10 MG/1
10 TABLET ORAL DAILY
Qty: 90 TABLET | Refills: 1 | Status: SHIPPED | OUTPATIENT
Start: 2024-02-19 | End: 2024-05-19

## 2024-02-19 RX ORDER — AZELASTINE HYDROCHLORIDE 137 UG/1
1 SPRAY, METERED NASAL 2 TIMES DAILY
Qty: 1 EACH | Refills: 0 | Status: SHIPPED | OUTPATIENT
Start: 2024-02-19

## 2024-02-19 ASSESSMENT — ENCOUNTER SYMPTOMS
EYE REDNESS: 0
BACK PAIN: 0
NAUSEA: 0
FATIGUE: 0
SORE THROAT: 0
TROUBLE SWALLOWING: 0
SINUS PRESSURE: 1
POLYDIPSIA: 0
APPETITE CHANGE: 0
RHINORRHEA: 1
SINUS PAIN: 0
ACTIVITY CHANGE: 0
WHEEZING: 0
CONFUSION: 0
DIARRHEA: 0
WOUND: 0
ABDOMINAL PAIN: 0
POLYPHAGIA: 0
HEADACHES: 0
CHILLS: 0
DIZZINESS: 0
FEVER: 0
COUGH: 0
WEAKNESS: 0
VOMITING: 0
SHORTNESS OF BREATH: 0
EYE PAIN: 0

## 2024-02-19 ASSESSMENT — PATIENT HEALTH QUESTIONNAIRE - PHQ9
SUM OF ALL RESPONSES TO PHQ9 QUESTIONS 1 AND 2: 1
1. LITTLE INTEREST OR PLEASURE IN DOING THINGS: NOT AT ALL
SUM OF ALL RESPONSES TO PHQ9 QUESTIONS 1 AND 2: 1
2. FEELING DOWN, DEPRESSED OR HOPELESS: SEVERAL DAYS
CLINICAL INTERPRETATION OF PHQ2 SCORE: NO FURTHER SCREENING NEEDED

## 2024-02-19 ASSESSMENT — PAIN SCALES - GENERAL: PAINLEVEL: 6

## 2024-03-14 ENCOUNTER — APPOINTMENT (OUTPATIENT)
Dept: GENERAL RADIOLOGY | Age: 69
End: 2024-03-14
Attending: EMERGENCY MEDICINE

## 2024-03-14 ENCOUNTER — NURSE TRIAGE (OUTPATIENT)
Dept: TELEHEALTH | Age: 69
End: 2024-03-14

## 2024-03-14 LAB
ALBUMIN SERPL-MCNC: 4.1 G/DL (ref 3.6–5.1)
ALBUMIN/GLOB SERPL: 1.3 {RATIO} (ref 1–2.4)
ALP SERPL-CCNC: 75 UNITS/L (ref 45–117)
ALT SERPL-CCNC: 43 UNITS/L
ANION GAP SERPL CALC-SCNC: 16 MMOL/L (ref 7–19)
AST SERPL-CCNC: 31 UNITS/L
BASOPHILS # BLD: 0 K/MCL (ref 0–0.3)
BASOPHILS NFR BLD: 1 %
BILIRUB SERPL-MCNC: 0.6 MG/DL (ref 0.2–1)
BUN SERPL-MCNC: 20 MG/DL (ref 6–20)
BUN/CREAT SERPL: 20 (ref 7–25)
CALCIUM SERPL-MCNC: 8.7 MG/DL (ref 8.4–10.2)
CHLORIDE SERPL-SCNC: 100 MMOL/L (ref 97–110)
CO2 SERPL-SCNC: 25 MMOL/L (ref 21–32)
CREAT SERPL-MCNC: 1.01 MG/DL (ref 0.67–1.17)
DEPRECATED RDW RBC: 49.9 FL (ref 39–50)
EGFRCR SERPLBLD CKD-EPI 2021: 82 ML/MIN/{1.73_M2}
EOSINOPHIL # BLD: 0 K/MCL (ref 0–0.5)
EOSINOPHIL NFR BLD: 1 %
ERYTHROCYTE [DISTWIDTH] IN BLOOD: 14.3 % (ref 11–15)
FASTING DURATION TIME PATIENT: ABNORMAL H
GLOBULIN SER-MCNC: 3.2 G/DL (ref 2–4)
GLUCOSE SERPL-MCNC: 101 MG/DL (ref 70–99)
HCT VFR BLD CALC: 46.3 % (ref 39–51)
HGB BLD-MCNC: 15.4 G/DL (ref 13–17)
IMM GRANULOCYTES # BLD AUTO: 0 K/MCL (ref 0–0.2)
IMM GRANULOCYTES # BLD: 0 %
LYMPHOCYTES # BLD: 1.2 K/MCL (ref 1–4)
LYMPHOCYTES NFR BLD: 19 %
MCH RBC QN AUTO: 31.6 PG (ref 26–34)
MCHC RBC AUTO-ENTMCNC: 33.3 G/DL (ref 32–36.5)
MCV RBC AUTO: 94.9 FL (ref 78–100)
MONOCYTES # BLD: 0.8 K/MCL (ref 0.3–0.9)
MONOCYTES NFR BLD: 13 %
NEUTROPHILS # BLD: 4.1 K/MCL (ref 1.8–7.7)
NEUTROPHILS NFR BLD: 66 %
NRBC BLD MANUAL-RTO: 0 /100 WBC
PLATELET # BLD AUTO: 155 K/MCL (ref 140–450)
POTASSIUM SERPL-SCNC: 4.2 MMOL/L (ref 3.4–5.1)
PROT SERPL-MCNC: 7.3 G/DL (ref 6.4–8.2)
RBC # BLD: 4.88 MIL/MCL (ref 4.5–5.9)
SODIUM SERPL-SCNC: 137 MMOL/L (ref 135–145)
TROPONIN I SERPL DL<=0.01 NG/ML-MCNC: 14 NG/L
TROPONIN I SERPL DL<=0.01 NG/ML-MCNC: 8 NG/L
WBC # BLD: 6.2 K/MCL (ref 4.2–11)

## 2024-03-14 PROCEDURE — 99285 EMERGENCY DEPT VISIT HI MDM: CPT

## 2024-03-14 PROCEDURE — 36415 COLL VENOUS BLD VENIPUNCTURE: CPT

## 2024-03-14 PROCEDURE — 93005 ELECTROCARDIOGRAM TRACING: CPT | Performed by: EMERGENCY MEDICINE

## 2024-03-14 PROCEDURE — 71046 X-RAY EXAM CHEST 2 VIEWS: CPT

## 2024-03-14 PROCEDURE — 80053 COMPREHEN METABOLIC PANEL: CPT | Performed by: EMERGENCY MEDICINE

## 2024-03-14 PROCEDURE — 84484 ASSAY OF TROPONIN QUANT: CPT | Performed by: EMERGENCY MEDICINE

## 2024-03-14 PROCEDURE — 85025 COMPLETE CBC W/AUTO DIFF WBC: CPT | Performed by: EMERGENCY MEDICINE

## 2024-03-14 PROCEDURE — 83735 ASSAY OF MAGNESIUM: CPT | Performed by: EMERGENCY MEDICINE

## 2024-03-14 PROCEDURE — 93010 ELECTROCARDIOGRAM REPORT: CPT | Performed by: INTERNAL MEDICINE

## 2024-03-15 ENCOUNTER — HOSPITAL ENCOUNTER (EMERGENCY)
Age: 69
Discharge: HOME | End: 2024-03-15
Attending: EMERGENCY MEDICINE

## 2024-03-15 VITALS
TEMPERATURE: 98.9 F | RESPIRATION RATE: 17 BRPM | BODY MASS INDEX: 26.68 KG/M2 | WEIGHT: 175.49 LBS | OXYGEN SATURATION: 98 % | DIASTOLIC BLOOD PRESSURE: 76 MMHG | SYSTOLIC BLOOD PRESSURE: 125 MMHG | HEART RATE: 67 BPM

## 2024-03-15 DIAGNOSIS — J11.1 INFLUENZA: Primary | ICD-10-CM

## 2024-03-15 LAB
APPEARANCE UR: CLEAR
ATRIAL RATE (BPM): 59
BACTERIA #/AREA URNS HPF: ABNORMAL /HPF
BILIRUB UR QL STRIP: NEGATIVE
COLOR UR: YELLOW
FLUAV RNA RESP QL NAA+PROBE: DETECTED
FLUBV RNA RESP QL NAA+PROBE: NOT DETECTED
GLUCOSE UR STRIP-MCNC: NEGATIVE MG/DL
HGB UR QL STRIP: NEGATIVE
HYALINE CASTS #/AREA URNS LPF: ABNORMAL /LPF
KETONES UR STRIP-MCNC: NEGATIVE MG/DL
LEUKOCYTE ESTERASE UR QL STRIP: ABNORMAL
MAGNESIUM SERPL-MCNC: 1.9 MG/DL (ref 1.7–2.4)
MUCOUS THREADS URNS QL MICRO: PRESENT
NITRITE UR QL STRIP: NEGATIVE
P AXIS (DEGREES): 51
PH UR STRIP: 5 [PH] (ref 5–7)
PR-INTERVAL (MSEC): 172
PROT UR STRIP-MCNC: ABNORMAL MG/DL
QRS-INTERVAL (MSEC): 78
QT-INTERVAL (MSEC): 434
QTC: 430
R AXIS (DEGREES): 65
RBC #/AREA URNS HPF: ABNORMAL /HPF
REPORT TEXT: NORMAL
RSV AG NPH QL IA.RAPID: NOT DETECTED
SARS-COV-2 RNA RESP QL NAA+PROBE: NOT DETECTED
SERVICE CMNT-IMP: ABNORMAL
SERVICE CMNT-IMP: ABNORMAL
SP GR UR STRIP: 1.02 (ref 1–1.03)
SQUAMOUS #/AREA URNS HPF: ABNORMAL /HPF
T AXIS (DEGREES): 37
UROBILINOGEN UR STRIP-MCNC: 0.2 MG/DL
VENTRICULAR RATE EKG/MIN (BPM): 59
WBC #/AREA URNS HPF: ABNORMAL /HPF

## 2024-03-15 PROCEDURE — 10002803 HB RX 637: Performed by: EMERGENCY MEDICINE

## 2024-03-15 PROCEDURE — 10002801 HB RX 250 W/O HCPCS: Performed by: EMERGENCY MEDICINE

## 2024-03-15 PROCEDURE — 0241U COVID/FLU/RSV PANEL: CPT | Performed by: EMERGENCY MEDICINE

## 2024-03-15 PROCEDURE — 99284 EMERGENCY DEPT VISIT MOD MDM: CPT | Performed by: EMERGENCY MEDICINE

## 2024-03-15 PROCEDURE — 87086 URINE CULTURE/COLONY COUNT: CPT | Performed by: EMERGENCY MEDICINE

## 2024-03-15 PROCEDURE — 81001 URINALYSIS AUTO W/SCOPE: CPT | Performed by: EMERGENCY MEDICINE

## 2024-03-15 RX ORDER — IPRATROPIUM BROMIDE AND ALBUTEROL SULFATE 2.5; .5 MG/3ML; MG/3ML
3 SOLUTION RESPIRATORY (INHALATION) ONCE
Status: COMPLETED | OUTPATIENT
Start: 2024-03-15 | End: 2024-03-15

## 2024-03-15 RX ORDER — CIPROFLOXACIN 500 MG/1
500 TABLET, FILM COATED ORAL EVERY 12 HOURS
Qty: 10 TABLET | Refills: 0 | Status: SHIPPED | OUTPATIENT
Start: 2024-03-15 | End: 2024-03-20

## 2024-03-15 RX ORDER — TRAMADOL HYDROCHLORIDE 50 MG/1
50 TABLET ORAL ONCE
Status: COMPLETED | OUTPATIENT
Start: 2024-03-15 | End: 2024-03-15

## 2024-03-15 RX ORDER — NAPROXEN 250 MG/1
500 TABLET ORAL ONCE
Status: COMPLETED | OUTPATIENT
Start: 2024-03-15 | End: 2024-03-15

## 2024-03-15 RX ADMIN — NAPROXEN 500 MG: 250 TABLET ORAL at 03:49

## 2024-03-15 RX ADMIN — TRAMADOL HYDROCHLORIDE 50 MG: 50 TABLET, COATED ORAL at 03:50

## 2024-03-15 RX ADMIN — IPRATROPIUM BROMIDE AND ALBUTEROL SULFATE 3 ML: 2.5; .5 SOLUTION RESPIRATORY (INHALATION) at 03:52

## 2024-03-15 SDOH — SOCIAL STABILITY: SOCIAL INSECURITY: HOW OFTEN DOES ANYONE, INCLUDING FAMILY AND FRIENDS, THREATEN YOU WITH HARM?: NEVER

## 2024-03-15 SDOH — SOCIAL STABILITY: SOCIAL INSECURITY: HOW OFTEN DOES ANYONE, INCLUDING FAMILY AND FRIENDS, INSULT OR TALK DOWN TO YOU?: NEVER

## 2024-03-15 SDOH — SOCIAL STABILITY: SOCIAL INSECURITY: HOW OFTEN DOES ANYONE, INCLUDING FAMILY AND FRIENDS, PHYSICALLY HURT YOU?: NEVER

## 2024-03-15 SDOH — SOCIAL STABILITY: SOCIAL INSECURITY: HOW OFTEN DOES ANYONE, INCLUDING FAMILY AND FRIENDS, SCREAM OR CURSE AT YOU?: NEVER

## 2024-03-15 ASSESSMENT — PAIN SCALES - GENERAL
PAINLEVEL_OUTOF10: 8
PAINLEVEL_OUTOF10: 8

## 2024-03-16 LAB — BACTERIA UR CULT: NO GROWTH

## 2024-03-20 ENCOUNTER — HOSPITAL ENCOUNTER (EMERGENCY)
Age: 69
Discharge: HOME | End: 2024-03-20
Attending: EMERGENCY MEDICINE

## 2024-03-20 ENCOUNTER — APPOINTMENT (OUTPATIENT)
Dept: CT IMAGING | Age: 69
End: 2024-03-20

## 2024-03-20 VITALS
OXYGEN SATURATION: 97 % | WEIGHT: 185 LBS | RESPIRATION RATE: 16 BRPM | SYSTOLIC BLOOD PRESSURE: 137 MMHG | BODY MASS INDEX: 28.13 KG/M2 | HEART RATE: 61 BPM | TEMPERATURE: 97.9 F | DIASTOLIC BLOOD PRESSURE: 79 MMHG

## 2024-03-20 DIAGNOSIS — K57.92 DIVERTICULITIS: Primary | ICD-10-CM

## 2024-03-20 LAB
ALBUMIN SERPL-MCNC: 4 G/DL (ref 3.6–5.1)
ALBUMIN/GLOB SERPL: 1.1 {RATIO} (ref 1–2.4)
ALP SERPL-CCNC: 100 UNITS/L (ref 45–117)
ALT SERPL-CCNC: 37 UNITS/L
ANION GAP SERPL CALC-SCNC: 14 MMOL/L (ref 7–19)
APPEARANCE UR: CLEAR
AST SERPL-CCNC: 22 UNITS/L
BASOPHILS # BLD: 0 K/MCL (ref 0–0.3)
BASOPHILS NFR BLD: 0 %
BILIRUB SERPL-MCNC: 0.7 MG/DL (ref 0.2–1)
BILIRUB UR QL STRIP: NEGATIVE
BUN SERPL-MCNC: 13 MG/DL (ref 6–20)
BUN/CREAT SERPL: 15 (ref 7–25)
CALCIUM SERPL-MCNC: 9.6 MG/DL (ref 8.4–10.2)
CHLORIDE SERPL-SCNC: 102 MMOL/L (ref 97–110)
CO2 SERPL-SCNC: 29 MMOL/L (ref 21–32)
COLOR UR: NORMAL
CREAT SERPL-MCNC: 0.88 MG/DL (ref 0.67–1.17)
DEPRECATED RDW RBC: 49 FL (ref 39–50)
EGFRCR SERPLBLD CKD-EPI 2021: >90 ML/MIN/{1.73_M2}
EOSINOPHIL # BLD: 0.2 K/MCL (ref 0–0.5)
EOSINOPHIL NFR BLD: 2 %
ERYTHROCYTE [DISTWIDTH] IN BLOOD: 14 % (ref 11–15)
FASTING DURATION TIME PATIENT: ABNORMAL H
GLOBULIN SER-MCNC: 3.5 G/DL (ref 2–4)
GLUCOSE SERPL-MCNC: 112 MG/DL (ref 70–99)
GLUCOSE UR STRIP-MCNC: NEGATIVE MG/DL
HCT VFR BLD CALC: 47.1 % (ref 39–51)
HGB BLD-MCNC: 15.4 G/DL (ref 13–17)
HGB UR QL STRIP: NEGATIVE
IMM GRANULOCYTES # BLD AUTO: 0.1 K/MCL (ref 0–0.2)
IMM GRANULOCYTES # BLD: 1 %
KETONES UR STRIP-MCNC: NEGATIVE MG/DL
LEUKOCYTE ESTERASE UR QL STRIP: NEGATIVE
LIPASE SERPL-CCNC: 82 UNITS/L (ref 15–77)
LYMPHOCYTES # BLD: 1.9 K/MCL (ref 1–4)
LYMPHOCYTES NFR BLD: 15 %
MCH RBC QN AUTO: 31.2 PG (ref 26–34)
MCHC RBC AUTO-ENTMCNC: 32.7 G/DL (ref 32–36.5)
MCV RBC AUTO: 95.3 FL (ref 78–100)
MONOCYTES # BLD: 1.2 K/MCL (ref 0.3–0.9)
MONOCYTES NFR BLD: 10 %
NEUTROPHILS # BLD: 8.8 K/MCL (ref 1.8–7.7)
NEUTROPHILS NFR BLD: 72 %
NITRITE UR QL STRIP: NEGATIVE
NRBC BLD MANUAL-RTO: 0 /100 WBC
PH UR STRIP: 6.5 [PH] (ref 5–7)
PLATELET # BLD AUTO: 245 K/MCL (ref 140–450)
POTASSIUM SERPL-SCNC: 4.8 MMOL/L (ref 3.4–5.1)
PROT SERPL-MCNC: 7.5 G/DL (ref 6.4–8.2)
PROT UR STRIP-MCNC: NEGATIVE MG/DL
RBC # BLD: 4.94 MIL/MCL (ref 4.5–5.9)
SODIUM SERPL-SCNC: 140 MMOL/L (ref 135–145)
SP GR UR STRIP: 1.01 (ref 1–1.03)
UROBILINOGEN UR STRIP-MCNC: 0.2 MG/DL
WBC # BLD: 12.2 K/MCL (ref 4.2–11)

## 2024-03-20 PROCEDURE — 83690 ASSAY OF LIPASE: CPT | Performed by: EMERGENCY MEDICINE

## 2024-03-20 PROCEDURE — 80053 COMPREHEN METABOLIC PANEL: CPT | Performed by: EMERGENCY MEDICINE

## 2024-03-20 PROCEDURE — 99285 EMERGENCY DEPT VISIT HI MDM: CPT | Performed by: EMERGENCY MEDICINE

## 2024-03-20 PROCEDURE — 85025 COMPLETE CBC W/AUTO DIFF WBC: CPT | Performed by: EMERGENCY MEDICINE

## 2024-03-20 PROCEDURE — 99284 EMERGENCY DEPT VISIT MOD MDM: CPT

## 2024-03-20 PROCEDURE — 96372 THER/PROPH/DIAG INJ SC/IM: CPT

## 2024-03-20 PROCEDURE — 10002803 HB RX 637

## 2024-03-20 PROCEDURE — 74177 CT ABD & PELVIS W/CONTRAST: CPT

## 2024-03-20 PROCEDURE — 10004651 HB RX, NO CHARGE ITEM

## 2024-03-20 PROCEDURE — 36415 COLL VENOUS BLD VENIPUNCTURE: CPT

## 2024-03-20 PROCEDURE — 10002805 HB CONTRAST AGENT

## 2024-03-20 PROCEDURE — 10002800 HB RX 250 W HCPCS

## 2024-03-20 PROCEDURE — 81003 URINALYSIS AUTO W/O SCOPE: CPT | Performed by: EMERGENCY MEDICINE

## 2024-03-20 RX ORDER — ACETAMINOPHEN 325 MG/1
650 TABLET ORAL ONCE
Status: COMPLETED | OUTPATIENT
Start: 2024-03-20 | End: 2024-03-20

## 2024-03-20 RX ORDER — METRONIDAZOLE 500 MG/1
500 TABLET ORAL 3 TIMES DAILY
Qty: 30 TABLET | Refills: 0 | Status: SHIPPED | OUTPATIENT
Start: 2024-03-20 | End: 2024-03-30

## 2024-03-20 RX ORDER — DICYCLOMINE HYDROCHLORIDE 10 MG/ML
INJECTION INTRAMUSCULAR
Status: COMPLETED
Start: 2024-03-20 | End: 2024-03-20

## 2024-03-20 RX ORDER — SULFAMETHOXAZOLE AND TRIMETHOPRIM 800; 160 MG/1; MG/1
1 TABLET ORAL 2 TIMES DAILY
Qty: 20 TABLET | Refills: 0 | Status: SHIPPED | OUTPATIENT
Start: 2024-03-20

## 2024-03-20 RX ORDER — DICYCLOMINE HYDROCHLORIDE 10 MG/1
10 CAPSULE ORAL 4 TIMES DAILY
Qty: 40 CAPSULE | Refills: 0 | Status: SHIPPED | OUTPATIENT
Start: 2024-03-20 | End: 2024-03-30

## 2024-03-20 RX ORDER — IBUPROFEN 200 MG
TABLET ORAL
Status: COMPLETED
Start: 2024-03-20 | End: 2024-03-20

## 2024-03-20 RX ORDER — IBUPROFEN 400 MG/1
TABLET ORAL
Status: COMPLETED
Start: 2024-03-20 | End: 2024-03-20

## 2024-03-20 RX ORDER — DICYCLOMINE HYDROCHLORIDE 10 MG/ML
20 INJECTION INTRAMUSCULAR ONCE
Status: COMPLETED | OUTPATIENT
Start: 2024-03-20 | End: 2024-03-20

## 2024-03-20 RX ADMIN — ACETAMINOPHEN 650 MG: 325 TABLET ORAL at 21:59

## 2024-03-20 RX ADMIN — DICYCLOMINE HYDROCHLORIDE 20 MG: 10 INJECTION INTRAMUSCULAR at 23:40

## 2024-03-20 RX ADMIN — IBUPROFEN 600 MG: 200 TABLET, FILM COATED ORAL at 23:38

## 2024-03-20 RX ADMIN — IOHEXOL 95 ML: 350 INJECTION, SOLUTION INTRAVENOUS at 21:47

## 2024-03-20 ASSESSMENT — ENCOUNTER SYMPTOMS
FEVER: 0
FATIGUE: 0
SHORTNESS OF BREATH: 0
ABDOMINAL PAIN: 1
CHEST TIGHTNESS: 0
WEAKNESS: 0
CHILLS: 0
WHEEZING: 0
VOMITING: 0
DIARRHEA: 0
COUGH: 0
CONSTIPATION: 1
NAUSEA: 0
DIZZINESS: 0

## 2024-03-20 ASSESSMENT — PAIN SCALES - GENERAL
PAINLEVEL_OUTOF10: 10
PAINLEVEL_OUTOF10: 10

## 2024-04-02 ENCOUNTER — NURSE TRIAGE (OUTPATIENT)
Dept: TELEHEALTH | Age: 69
End: 2024-04-02

## 2024-04-04 ENCOUNTER — OFFICE VISIT (OUTPATIENT)
Dept: INTERNAL MEDICINE | Age: 69
End: 2024-04-04

## 2024-04-04 ENCOUNTER — LAB SERVICES (OUTPATIENT)
Dept: LAB | Age: 69
End: 2024-04-04

## 2024-04-04 VITALS
OXYGEN SATURATION: 97 % | HEIGHT: 68 IN | SYSTOLIC BLOOD PRESSURE: 130 MMHG | TEMPERATURE: 98.4 F | HEART RATE: 73 BPM | DIASTOLIC BLOOD PRESSURE: 78 MMHG | BODY MASS INDEX: 26.95 KG/M2 | WEIGHT: 177.8 LBS

## 2024-04-04 DIAGNOSIS — Z00.00 HEALTHCARE MAINTENANCE: ICD-10-CM

## 2024-04-04 DIAGNOSIS — G95.9 CERVICAL MYELOPATHY (CMD): ICD-10-CM

## 2024-04-04 DIAGNOSIS — K57.32 DIVERTICULITIS OF LARGE INTESTINE WITHOUT PERFORATION OR ABSCESS WITHOUT BLEEDING: ICD-10-CM

## 2024-04-04 DIAGNOSIS — E11.9 CONTROLLED TYPE 2 DIABETES MELLITUS WITHOUT COMPLICATION, WITHOUT LONG-TERM CURRENT USE OF INSULIN (CMD): ICD-10-CM

## 2024-04-04 DIAGNOSIS — F17.200 SMOKING ADDICTION: ICD-10-CM

## 2024-04-04 DIAGNOSIS — J20.9 ACUTE BRONCHITIS WITH BRONCHOSPASM: Primary | ICD-10-CM

## 2024-04-04 DIAGNOSIS — I10 ESSENTIAL HYPERTENSION: ICD-10-CM

## 2024-04-04 DIAGNOSIS — Z00.00 MEDICARE ANNUAL WELLNESS VISIT, SUBSEQUENT: ICD-10-CM

## 2024-04-04 DIAGNOSIS — Z12.11 COLON CANCER SCREENING: ICD-10-CM

## 2024-04-04 DIAGNOSIS — I73.9 PAD (PERIPHERAL ARTERY DISEASE) (CMD): ICD-10-CM

## 2024-04-04 DIAGNOSIS — E11.9 CONTROLLED TYPE 2 DIABETES MELLITUS WITHOUT COMPLICATION, WITHOUT LONG-TERM CURRENT USE OF INSULIN  (CMD): ICD-10-CM

## 2024-04-04 DIAGNOSIS — J20.9 ACUTE BRONCHITIS WITH BRONCHOSPASM: ICD-10-CM

## 2024-04-04 PROCEDURE — 83036 HEMOGLOBIN GLYCOSYLATED A1C: CPT | Performed by: CLINICAL MEDICAL LABORATORY

## 2024-04-04 PROCEDURE — 85025 COMPLETE CBC W/AUTO DIFF WBC: CPT | Performed by: CLINICAL MEDICAL LABORATORY

## 2024-04-04 PROCEDURE — 86803 HEPATITIS C AB TEST: CPT | Performed by: CLINICAL MEDICAL LABORATORY

## 2024-04-04 PROCEDURE — 36415 COLL VENOUS BLD VENIPUNCTURE: CPT | Performed by: INTERNAL MEDICINE

## 2024-04-04 PROCEDURE — G0103 PSA SCREENING: HCPCS | Performed by: CLINICAL MEDICAL LABORATORY

## 2024-04-04 RX ORDER — ALBUTEROL SULFATE 90 UG/1
2 AEROSOL, METERED RESPIRATORY (INHALATION) 3 TIMES DAILY
Qty: 1 EACH | Refills: 0 | Status: SHIPPED | OUTPATIENT
Start: 2024-04-04

## 2024-04-04 RX ORDER — AMOXICILLIN AND CLAVULANATE POTASSIUM 875; 125 MG/1; MG/1
1 TABLET, FILM COATED ORAL 2 TIMES DAILY
Qty: 14 TABLET | Refills: 0 | Status: SHIPPED | OUTPATIENT
Start: 2024-04-04 | End: 2024-04-11

## 2024-04-04 ASSESSMENT — ENCOUNTER SYMPTOMS
WEAKNESS: 0
TROUBLE SWALLOWING: 0
DIZZINESS: 0
RHINORRHEA: 1
VOMITING: 0
NAUSEA: 0
CHILLS: 0
BACK PAIN: 0
APPETITE CHANGE: 0
CONFUSION: 0
EYE PAIN: 0
SINUS PRESSURE: 1
CONSTIPATION: 0
ACTIVITY CHANGE: 0
EYE REDNESS: 0
FEVER: 0
POLYPHAGIA: 0
WHEEZING: 0
SHORTNESS OF BREATH: 0
FATIGUE: 0
WOUND: 0
BLOOD IN STOOL: 0
RECTAL PAIN: 0
COUGH: 1
HEADACHES: 0
POLYDIPSIA: 0
ABDOMINAL PAIN: 1
SINUS PAIN: 1
CHEST TIGHTNESS: 1
SORE THROAT: 0
DIARRHEA: 0

## 2024-04-04 ASSESSMENT — COGNITIVE AND FUNCTIONAL STATUS - GENERAL
BECAUSE OF A PHYSICAL, MENTAL, OR EMOTIONAL CONDITION, DO YOU HAVE SERIOUS DIFFICULTY CONCENTRATING, REMEMBERING OR MAKING DECISIONS: NO
DO YOU HAVE SERIOUS DIFFICULTY WALKING OR CLIMBING STAIRS: NO
DO YOU HAVE DIFFICULTY DRESSING OR BATHING: NO
BECAUSE OF A PHYSICAL, MENTAL, OR EMOTIONAL CONDITION, DO YOU HAVE DIFFICULTY DOING ERRANDS ALONE: NO

## 2024-04-04 ASSESSMENT — PATIENT HEALTH QUESTIONNAIRE - PHQ9
CLINICAL INTERPRETATION OF PHQ2 SCORE: NO FURTHER SCREENING NEEDED
SUM OF ALL RESPONSES TO PHQ9 QUESTIONS 1 AND 2: 0
SUM OF ALL RESPONSES TO PHQ9 QUESTIONS 1 AND 2: 0
1. LITTLE INTEREST OR PLEASURE IN DOING THINGS: NOT AT ALL
2. FEELING DOWN, DEPRESSED OR HOPELESS: NOT AT ALL

## 2024-04-05 ENCOUNTER — HOSPITAL ENCOUNTER (OUTPATIENT)
Dept: CT IMAGING | Age: 69
End: 2024-04-05
Attending: INTERNAL MEDICINE

## 2024-04-05 DIAGNOSIS — F17.200 SMOKING ADDICTION: ICD-10-CM

## 2024-04-05 LAB
ALBUMIN SERPL-MCNC: 3.8 G/DL (ref 3.6–5.1)
ALBUMIN/GLOB SERPL: 1.4 {RATIO} (ref 1–2.4)
ALP SERPL-CCNC: 84 UNITS/L (ref 45–117)
ALT SERPL-CCNC: 31 UNITS/L
ANION GAP SERPL CALC-SCNC: 10 MMOL/L (ref 7–19)
AST SERPL-CCNC: 16 UNITS/L
BASOPHILS # BLD: 0.1 K/MCL (ref 0–0.3)
BASOPHILS NFR BLD: 1 %
BILIRUB SERPL-MCNC: 0.8 MG/DL (ref 0.2–1)
BUN SERPL-MCNC: 11 MG/DL (ref 6–20)
BUN/CREAT SERPL: 13 (ref 7–25)
CALCIUM SERPL-MCNC: 8.9 MG/DL (ref 8.4–10.2)
CHLORIDE SERPL-SCNC: 111 MMOL/L (ref 97–110)
CHOLEST SERPL-MCNC: 153 MG/DL
CHOLEST/HDLC SERPL: 2.3 {RATIO}
CO2 SERPL-SCNC: 25 MMOL/L (ref 21–32)
CREAT SERPL-MCNC: 0.83 MG/DL (ref 0.67–1.17)
CREAT UR-MCNC: 71.4 MG/DL
DEPRECATED RDW RBC: 49.4 FL (ref 39–50)
EGFRCR SERPLBLD CKD-EPI 2021: >90 ML/MIN/{1.73_M2}
EOSINOPHIL # BLD: 0.2 K/MCL (ref 0–0.5)
EOSINOPHIL NFR BLD: 2 %
ERYTHROCYTE [DISTWIDTH] IN BLOOD: 14 % (ref 11–15)
FASTING DURATION TIME PATIENT: ABNORMAL H
GLOBULIN SER-MCNC: 2.7 G/DL (ref 2–4)
GLUCOSE SERPL-MCNC: 108 MG/DL (ref 70–99)
HBA1C MFR BLD: 6.7 % (ref 4.5–5.6)
HCT VFR BLD CALC: 43 % (ref 39–51)
HCV AB SER QL: NEGATIVE
HDLC SERPL-MCNC: 67 MG/DL
HGB BLD-MCNC: 13.9 G/DL (ref 13–17)
IMM GRANULOCYTES # BLD AUTO: 0 K/MCL (ref 0–0.2)
IMM GRANULOCYTES # BLD: 0 %
LDLC SERPL CALC-MCNC: 61 MG/DL
LYMPHOCYTES # BLD: 2.3 K/MCL (ref 1–4)
LYMPHOCYTES NFR BLD: 24 %
MCH RBC QN AUTO: 31.4 PG (ref 26–34)
MCHC RBC AUTO-ENTMCNC: 32.3 G/DL (ref 32–36.5)
MCV RBC AUTO: 97.1 FL (ref 78–100)
MICROALBUMIN UR-MCNC: 0.94 MG/DL
MICROALBUMIN/CREAT UR: 13.2 MG/G
MONOCYTES # BLD: 0.7 K/MCL (ref 0.3–0.9)
MONOCYTES NFR BLD: 8 %
NEUTROPHILS # BLD: 6 K/MCL (ref 1.8–7.7)
NEUTROPHILS NFR BLD: 65 %
NONHDLC SERPL-MCNC: 86 MG/DL
NRBC BLD MANUAL-RTO: 0 /100 WBC
PLATELET # BLD AUTO: 285 K/MCL (ref 140–450)
POTASSIUM SERPL-SCNC: 4.3 MMOL/L (ref 3.4–5.1)
PROT SERPL-MCNC: 6.5 G/DL (ref 6.4–8.2)
PSA SERPL-MCNC: 2.84 NG/ML
RBC # BLD: 4.43 MIL/MCL (ref 4.5–5.9)
SODIUM SERPL-SCNC: 142 MMOL/L (ref 135–145)
TRIGL SERPL-MCNC: 124 MG/DL
TSH SERPL-ACNC: 0.85 MCUNITS/ML (ref 0.35–5)
WBC # BLD: 9.3 K/MCL (ref 4.2–11)

## 2024-04-05 PROCEDURE — 71271 CT THORAX LUNG CANCER SCR C-: CPT

## 2024-04-08 ENCOUNTER — TELEPHONE (OUTPATIENT)
Dept: INTERNAL MEDICINE | Age: 69
End: 2024-04-08

## 2024-04-18 ENCOUNTER — TELEPHONE (OUTPATIENT)
Dept: INTERNAL MEDICINE | Age: 69
End: 2024-04-18

## 2024-06-10 ENCOUNTER — APPOINTMENT (OUTPATIENT)
Dept: FAMILY MEDICINE | Age: 69
End: 2024-06-10

## 2024-06-10 VITALS
HEART RATE: 86 BPM | BODY MASS INDEX: 25.86 KG/M2 | OXYGEN SATURATION: 98 % | RESPIRATION RATE: 20 BRPM | TEMPERATURE: 98.5 F | WEIGHT: 170.64 LBS | DIASTOLIC BLOOD PRESSURE: 84 MMHG | HEIGHT: 68 IN | SYSTOLIC BLOOD PRESSURE: 136 MMHG

## 2024-06-10 DIAGNOSIS — J32.9 RECURRENT SINUSITIS: ICD-10-CM

## 2024-06-10 DIAGNOSIS — I10 PRIMARY HYPERTENSION: ICD-10-CM

## 2024-06-10 DIAGNOSIS — E11.51 DIABETES MELLITUS WITH PERIPHERAL VASCULAR DISEASE  (CMD): Primary | ICD-10-CM

## 2024-06-10 DIAGNOSIS — Z12.11 COLON CANCER SCREENING: ICD-10-CM

## 2024-06-10 DIAGNOSIS — K40.20 BILATERAL INGUINAL HERNIA WITHOUT OBSTRUCTION OR GANGRENE, RECURRENCE NOT SPECIFIED: ICD-10-CM

## 2024-06-10 DIAGNOSIS — J43.8 OTHER EMPHYSEMA  (CMD): ICD-10-CM

## 2024-06-10 DIAGNOSIS — E78.2 MIXED HYPERLIPIDEMIA: ICD-10-CM

## 2024-06-10 DIAGNOSIS — K57.90 DIVERTICULOSIS: ICD-10-CM

## 2024-06-10 DIAGNOSIS — N28.1 KIDNEY CYSTS: ICD-10-CM

## 2024-06-10 DIAGNOSIS — M54.16 LUMBAR RADICULOPATHY: ICD-10-CM

## 2024-06-10 DIAGNOSIS — R63.4 WEIGHT LOSS: ICD-10-CM

## 2024-06-10 DIAGNOSIS — F33.0 MILD RECURRENT MAJOR DEPRESSION (CMD): ICD-10-CM

## 2024-06-10 DIAGNOSIS — R07.89 ATYPICAL CHEST PAIN: ICD-10-CM

## 2024-06-10 DIAGNOSIS — F17.200 SMOKER: ICD-10-CM

## 2024-06-10 PROBLEM — Z98.890 S/P ROTATOR CUFF REPAIR: Status: ACTIVE | Noted: 2018-03-06

## 2024-06-10 PROBLEM — E11.69 ERECTILE DYSFUNCTION DUE TO DIABETES MELLITUS  (CMD): Chronic | Status: ACTIVE | Noted: 2022-06-09

## 2024-06-10 PROBLEM — N52.1 ERECTILE DYSFUNCTION DUE TO DIABETES MELLITUS  (CMD): Chronic | Status: ACTIVE | Noted: 2022-06-09

## 2024-06-10 PROBLEM — Z91.89 AT RISK FOR SLEEP APNEA: Status: RESOLVED | Noted: 2023-03-07 | Resolved: 2024-06-10

## 2024-06-10 PROBLEM — G56.01 CARPAL TUNNEL SYNDROME ON RIGHT: Status: ACTIVE | Noted: 2017-02-16

## 2024-06-10 PROBLEM — Z09 HOSPITAL DISCHARGE FOLLOW-UP: Status: RESOLVED | Noted: 2023-03-06 | Resolved: 2024-06-10

## 2024-06-10 RX ORDER — ESCITALOPRAM OXALATE 10 MG/1
10 TABLET ORAL DAILY
Qty: 90 TABLET | Refills: 1 | Status: SHIPPED | OUTPATIENT
Start: 2024-06-10 | End: 2024-09-08

## 2024-06-10 ASSESSMENT — ENCOUNTER SYMPTOMS
RESPIRATORY NEGATIVE: 1
DIZZINESS: 0
UNEXPECTED WEIGHT CHANGE: 1
ENDOCRINE NEGATIVE: 1
EYES NEGATIVE: 1
BACK PAIN: 1
GASTROINTESTINAL NEGATIVE: 1

## 2024-06-10 ASSESSMENT — PATIENT HEALTH QUESTIONNAIRE - PHQ9
CLINICAL INTERPRETATION OF PHQ2 SCORE: FURTHER SCREENING NEEDED
1. LITTLE INTEREST OR PLEASURE IN DOING THINGS: NEARLY EVERY DAY
SUM OF ALL RESPONSES TO PHQ9 QUESTIONS 1 AND 2: 6
SUM OF ALL RESPONSES TO PHQ9 QUESTIONS 1 AND 2: 6
2. FEELING DOWN, DEPRESSED OR HOPELESS: NEARLY EVERY DAY

## 2024-06-10 ASSESSMENT — PAIN SCALES - GENERAL: PAINLEVEL: 0

## 2024-06-20 ENCOUNTER — APPOINTMENT (OUTPATIENT)
Dept: CARDIOLOGY | Age: 69
End: 2024-06-20
Attending: FAMILY MEDICINE

## 2024-06-20 VITALS
HEART RATE: 63 BPM | WEIGHT: 169.8 LBS | DIASTOLIC BLOOD PRESSURE: 75 MMHG | HEIGHT: 68 IN | BODY MASS INDEX: 25.73 KG/M2 | SYSTOLIC BLOOD PRESSURE: 138 MMHG

## 2024-06-20 DIAGNOSIS — I73.9 PAD (PERIPHERAL ARTERY DISEASE) (CMD): ICD-10-CM

## 2024-06-20 DIAGNOSIS — I49.3 FREQUENT PVCS: ICD-10-CM

## 2024-06-20 DIAGNOSIS — I10 PRIMARY HYPERTENSION: ICD-10-CM

## 2024-06-20 DIAGNOSIS — Z72.0 TOBACCO USE: ICD-10-CM

## 2024-06-20 DIAGNOSIS — R07.9 CHEST PAIN, UNSPECIFIED TYPE: Primary | ICD-10-CM

## 2024-06-20 DIAGNOSIS — E11.9 CONTROLLED TYPE 2 DIABETES MELLITUS WITHOUT COMPLICATION, WITHOUT LONG-TERM CURRENT USE OF INSULIN  (CMD): ICD-10-CM

## 2024-06-20 DIAGNOSIS — E78.2 MIXED HYPERLIPIDEMIA: ICD-10-CM

## 2024-06-25 ENCOUNTER — EXTERNAL LAB (OUTPATIENT)
Dept: HEALTH INFORMATION MANAGEMENT | Facility: OTHER | Age: 69
End: 2024-06-25

## 2024-06-25 LAB — HBA1C MFR BLD: 6.9 %

## 2024-07-01 ENCOUNTER — HOSPITAL ENCOUNTER (OUTPATIENT)
Dept: NUCLEAR MEDICINE | Age: 69
Discharge: HOME | End: 2024-07-01
Attending: INTERNAL MEDICINE

## 2024-07-01 ENCOUNTER — HOSPITAL ENCOUNTER (OUTPATIENT)
Dept: CARDIOLOGY | Age: 69
Discharge: HOME | End: 2024-07-01
Attending: INTERNAL MEDICINE

## 2024-07-01 DIAGNOSIS — R07.9 CHEST PAIN, UNSPECIFIED TYPE: ICD-10-CM

## 2024-07-01 PROCEDURE — 10002800 HB RX 250 W HCPCS: Performed by: INTERNAL MEDICINE

## 2024-07-01 PROCEDURE — 78452 HT MUSCLE IMAGE SPECT MULT: CPT

## 2024-07-01 PROCEDURE — A9500 TC99M SESTAMIBI: HCPCS | Performed by: INTERNAL MEDICINE

## 2024-07-01 PROCEDURE — 93016 CV STRESS TEST SUPVJ ONLY: CPT | Performed by: INTERNAL MEDICINE

## 2024-07-01 PROCEDURE — 78452 HT MUSCLE IMAGE SPECT MULT: CPT | Performed by: INTERNAL MEDICINE

## 2024-07-01 PROCEDURE — 93018 CV STRESS TEST I&R ONLY: CPT | Performed by: INTERNAL MEDICINE

## 2024-07-01 PROCEDURE — 93017 CV STRESS TEST TRACING ONLY: CPT

## 2024-07-01 PROCEDURE — 10006150 HB RX 343: Performed by: INTERNAL MEDICINE

## 2024-07-01 RX ORDER — TETRAKIS(2-METHOXYISOBUTYLISOCYANIDE)COPPER(I) TETRAFLUOROBORATE 1 MG/ML
35 INJECTION, POWDER, LYOPHILIZED, FOR SOLUTION INTRAVENOUS ONCE
Status: COMPLETED | OUTPATIENT
Start: 2024-07-01 | End: 2024-07-01

## 2024-07-01 RX ORDER — TETRAKIS(2-METHOXYISOBUTYLISOCYANIDE)COPPER(I) TETRAFLUOROBORATE 1 MG/ML
11.7 INJECTION, POWDER, LYOPHILIZED, FOR SOLUTION INTRAVENOUS ONCE
Status: COMPLETED | OUTPATIENT
Start: 2024-07-01 | End: 2024-07-01

## 2024-07-01 RX ORDER — REGADENOSON 0.08 MG/ML
0.4 INJECTION, SOLUTION INTRAVENOUS ONCE
Status: COMPLETED | OUTPATIENT
Start: 2024-07-01 | End: 2024-07-01

## 2024-07-01 RX ADMIN — TETRAKIS(2-METHOXYISOBUTYLISOCYANIDE)COPPER(I) TETRAFLUOROBORATE 35 MILLICURIE: 1 INJECTION, POWDER, LYOPHILIZED, FOR SOLUTION INTRAVENOUS at 09:50

## 2024-07-01 RX ADMIN — REGADENOSON 0.4 MG: 0.08 INJECTION, SOLUTION INTRAVENOUS at 09:46

## 2024-07-01 RX ADMIN — TETRAKIS(2-METHOXYISOBUTYLISOCYANIDE)COPPER(I) TETRAFLUOROBORATE 11.7 MILLICURIE: 1 INJECTION, POWDER, LYOPHILIZED, FOR SOLUTION INTRAVENOUS at 08:30

## 2024-07-08 ENCOUNTER — HOSPITAL ENCOUNTER (OUTPATIENT)
Dept: CARDIOLOGY | Age: 69
Discharge: HOME | End: 2024-07-08
Attending: INTERNAL MEDICINE

## 2024-07-08 DIAGNOSIS — R07.9 CHEST PAIN, UNSPECIFIED TYPE: ICD-10-CM

## 2024-07-08 LAB
AORTIC VALVE AREA (AVA): 0.89
AV STENOSIS SEVERITY TEXT: NORMAL
AVI LVOT PEAK GRADIENT (LVOTMG): 1
INTERVENTRICULAR SEPTUM IN END DIASTOLE (IVSD): 1.5
LEFT INTERNAL DIMENSION IN SYSTOLE (LVSD): 1
LEFT VENTRICULAR INTERNAL DIMENSION IN DIASTOLE (LVDD): 3.3
LEFT VENTRICULAR POSTERIOR WALL IN END DIASTOLE (LVPW): 5.5
LV EF: NORMAL %
MV PEAK A VELOCITY (MVPAV): 206
MV PEAK E VELOCITY (MVPEV): 1.17

## 2024-07-08 PROCEDURE — 93306 TTE W/DOPPLER COMPLETE: CPT | Performed by: INTERNAL MEDICINE

## 2024-07-08 PROCEDURE — 93306 TTE W/DOPPLER COMPLETE: CPT

## 2024-07-09 ENCOUNTER — TELEPHONE (OUTPATIENT)
Dept: CARDIOLOGY | Age: 69
End: 2024-07-09

## 2024-07-10 ENCOUNTER — WALK IN (OUTPATIENT)
Dept: URGENT CARE | Age: 69
End: 2024-07-10

## 2024-07-10 ENCOUNTER — NURSE TRIAGE (OUTPATIENT)
Dept: TELEHEALTH | Age: 69
End: 2024-07-10

## 2024-07-10 ENCOUNTER — TELEPHONE (OUTPATIENT)
Dept: FAMILY MEDICINE | Age: 69
End: 2024-07-10

## 2024-07-10 VITALS
DIASTOLIC BLOOD PRESSURE: 87 MMHG | TEMPERATURE: 97.9 F | OXYGEN SATURATION: 100 % | BODY MASS INDEX: 26.36 KG/M2 | SYSTOLIC BLOOD PRESSURE: 145 MMHG | RESPIRATION RATE: 19 BRPM | HEIGHT: 68 IN | HEART RATE: 63 BPM | WEIGHT: 173.94 LBS

## 2024-07-10 DIAGNOSIS — R09.89 OTHER SPECIFIED SYMPTOMS AND SIGNS INVOLVING THE CIRCULATORY AND RESPIRATORY SYSTEMS: ICD-10-CM

## 2024-07-10 DIAGNOSIS — Z87.898 HISTORY OF DIZZINESS: ICD-10-CM

## 2024-07-10 DIAGNOSIS — M54.12 CERVICAL RADICULOPATHY: Primary | ICD-10-CM

## 2024-07-10 RX ORDER — LIDOCAINE 50 MG/G
1 PATCH TOPICAL
Qty: 30 PATCH | Refills: 11 | Status: SHIPPED | OUTPATIENT
Start: 2024-07-10

## 2024-07-10 RX ORDER — TIZANIDINE 4 MG/1
4 TABLET ORAL
Qty: 30 TABLET | Refills: 0 | Status: SHIPPED | OUTPATIENT
Start: 2024-07-10

## 2024-07-10 ASSESSMENT — ENCOUNTER SYMPTOMS
DIZZINESS: 1
RESPIRATORY NEGATIVE: 1
ENDOCRINE NEGATIVE: 1
HEADACHES: 0
HEMATOLOGIC/LYMPHATIC NEGATIVE: 1
CONSTITUTIONAL NEGATIVE: 1
NUMBNESS: 0
TREMORS: 0
LIGHT-HEADEDNESS: 0
EYES NEGATIVE: 1
WEAKNESS: 0
FACIAL ASYMMETRY: 0
GASTROINTESTINAL NEGATIVE: 1
SEIZURES: 0
SPEECH DIFFICULTY: 0

## 2024-07-11 ENCOUNTER — HOSPITAL ENCOUNTER (OUTPATIENT)
Dept: VASCULAR LAB | Age: 69
Discharge: HOME | End: 2024-07-11
Attending: NURSE PRACTITIONER

## 2024-07-11 DIAGNOSIS — Z87.898 HISTORY OF DIZZINESS: ICD-10-CM

## 2024-07-11 DIAGNOSIS — R09.89 OTHER SPECIFIED SYMPTOMS AND SIGNS INVOLVING THE CIRCULATORY AND RESPIRATORY SYSTEMS: ICD-10-CM

## 2024-07-11 DIAGNOSIS — M54.12 CERVICAL RADICULOPATHY: ICD-10-CM

## 2024-07-11 PROCEDURE — 93880 EXTRACRANIAL BILAT STUDY: CPT

## 2024-07-28 ENCOUNTER — EXTERNAL RECORD (OUTPATIENT)
Dept: HEALTH INFORMATION MANAGEMENT | Facility: OTHER | Age: 69
End: 2024-07-28

## 2024-07-31 ENCOUNTER — EXTERNAL RECORD (OUTPATIENT)
Dept: HEALTH INFORMATION MANAGEMENT | Facility: OTHER | Age: 69
End: 2024-07-31

## 2024-09-05 DIAGNOSIS — F33.0 MILD RECURRENT MAJOR DEPRESSION (CMD): ICD-10-CM

## 2024-09-05 RX ORDER — METFORMIN HCL 500 MG
500 TABLET, EXTENDED RELEASE 24 HR ORAL
Qty: 90 TABLET | Refills: 1 | Status: SHIPPED | OUTPATIENT
Start: 2024-09-05 | End: 2024-12-04

## 2024-09-05 RX ORDER — LOSARTAN POTASSIUM 50 MG/1
50 TABLET ORAL DAILY
Qty: 90 TABLET | Refills: 1 | Status: SHIPPED | OUTPATIENT
Start: 2024-09-05 | End: 2024-12-04

## 2024-09-05 RX ORDER — ASPIRIN 81 MG/1
81 TABLET ORAL DAILY
Qty: 90 TABLET | Refills: 1 | Status: CANCELLED | OUTPATIENT
Start: 2024-09-05 | End: 2024-12-04

## 2024-09-05 RX ORDER — ATORVASTATIN CALCIUM 80 MG/1
80 TABLET, FILM COATED ORAL DAILY
Qty: 90 TABLET | Refills: 1 | Status: SHIPPED | OUTPATIENT
Start: 2024-09-05 | End: 2024-12-04

## 2024-09-05 RX ORDER — ESCITALOPRAM OXALATE 10 MG/1
10 TABLET ORAL DAILY
Qty: 90 TABLET | Refills: 1 | Status: CANCELLED | OUTPATIENT
Start: 2024-09-05 | End: 2024-12-04

## 2024-10-16 ENCOUNTER — APPOINTMENT (OUTPATIENT)
Dept: FAMILY MEDICINE | Age: 69
End: 2024-10-16

## 2024-10-16 VITALS
WEIGHT: 166.8 LBS | DIASTOLIC BLOOD PRESSURE: 80 MMHG | HEART RATE: 94 BPM | OXYGEN SATURATION: 98 % | HEIGHT: 68 IN | BODY MASS INDEX: 25.28 KG/M2 | RESPIRATION RATE: 20 BRPM | SYSTOLIC BLOOD PRESSURE: 138 MMHG | TEMPERATURE: 98.6 F

## 2024-10-16 DIAGNOSIS — I10 PRIMARY HYPERTENSION: ICD-10-CM

## 2024-10-16 DIAGNOSIS — E11.9 CONTROLLED TYPE 2 DIABETES MELLITUS WITHOUT COMPLICATION, WITHOUT LONG-TERM CURRENT USE OF INSULIN  (CMD): Primary | ICD-10-CM

## 2024-10-16 DIAGNOSIS — M54.16 LUMBAR RADICULOPATHY: ICD-10-CM

## 2024-10-16 DIAGNOSIS — Z12.11 COLON CANCER SCREENING: ICD-10-CM

## 2024-10-16 DIAGNOSIS — I73.9 PAD (PERIPHERAL ARTERY DISEASE) (CMD): ICD-10-CM

## 2024-10-16 DIAGNOSIS — F17.200 SMOKER: ICD-10-CM

## 2024-10-16 DIAGNOSIS — E78.2 MIXED HYPERLIPIDEMIA: ICD-10-CM

## 2024-10-16 DIAGNOSIS — J43.8 OTHER EMPHYSEMA  (CMD): ICD-10-CM

## 2024-10-16 DIAGNOSIS — F33.0 MILD RECURRENT MAJOR DEPRESSION (CMD): ICD-10-CM

## 2024-10-16 PROBLEM — Z72.0 TOBACCO USE: Status: RESOLVED | Noted: 2024-06-20 | Resolved: 2024-10-16

## 2024-10-16 ASSESSMENT — PATIENT HEALTH QUESTIONNAIRE - PHQ9
2. FEELING DOWN, DEPRESSED OR HOPELESS: NEARLY EVERY DAY
SUM OF ALL RESPONSES TO PHQ9 QUESTIONS 1 AND 2: 4
SUM OF ALL RESPONSES TO PHQ9 QUESTIONS 1 AND 2: 4
CLINICAL INTERPRETATION OF PHQ2 SCORE: FURTHER SCREENING NEEDED
1. LITTLE INTEREST OR PLEASURE IN DOING THINGS: SEVERAL DAYS

## 2024-10-16 ASSESSMENT — ENCOUNTER SYMPTOMS
ENDOCRINE NEGATIVE: 1
WEAKNESS: 1
HEMATOLOGIC/LYMPHATIC NEGATIVE: 1
EYES NEGATIVE: 1
BACK PAIN: 1
RESPIRATORY NEGATIVE: 1
GASTROINTESTINAL NEGATIVE: 1
FATIGUE: 1

## 2024-10-16 ASSESSMENT — PAIN SCALES - GENERAL: PAINLEVEL: 0

## 2024-10-24 ENCOUNTER — APPOINTMENT (OUTPATIENT)
Dept: LAB | Age: 69
End: 2024-10-24

## 2024-10-24 DIAGNOSIS — E11.9 CONTROLLED TYPE 2 DIABETES MELLITUS WITHOUT COMPLICATION, WITHOUT LONG-TERM CURRENT USE OF INSULIN  (CMD): ICD-10-CM

## 2024-10-24 LAB
ALBUMIN SERPL-MCNC: 3.9 G/DL (ref 3.4–5)
ALBUMIN/GLOB SERPL: 1.5 {RATIO} (ref 1–2.4)
ALP SERPL-CCNC: 77 UNITS/L (ref 45–117)
ALT SERPL-CCNC: 32 UNITS/L
ANION GAP SERPL CALC-SCNC: 10 MMOL/L (ref 7–19)
AST SERPL-CCNC: 18 UNITS/L
BILIRUB SERPL-MCNC: 0.6 MG/DL (ref 0.2–1)
BUN SERPL-MCNC: 16 MG/DL (ref 6–20)
BUN/CREAT SERPL: 21 (ref 7–25)
CALCIUM SERPL-MCNC: 9.5 MG/DL (ref 8.4–10.2)
CHLORIDE SERPL-SCNC: 109 MMOL/L (ref 97–110)
CO2 SERPL-SCNC: 26 MMOL/L (ref 21–32)
CREAT SERPL-MCNC: 0.78 MG/DL (ref 0.67–1.17)
EGFRCR SERPLBLD CKD-EPI 2021: >90 ML/MIN/{1.73_M2}
FASTING DURATION TIME PATIENT: ABNORMAL H
GLOBULIN SER-MCNC: 2.6 G/DL (ref 2–4)
GLUCOSE SERPL-MCNC: 121 MG/DL (ref 70–99)
POTASSIUM SERPL-SCNC: 4.6 MMOL/L (ref 3.4–5.1)
PROT SERPL-MCNC: 6.5 G/DL (ref 6.4–8.2)
SODIUM SERPL-SCNC: 140 MMOL/L (ref 135–145)

## 2024-10-24 PROCEDURE — 80053 COMPREHEN METABOLIC PANEL: CPT | Performed by: CLINICAL MEDICAL LABORATORY

## 2024-10-24 PROCEDURE — 36415 COLL VENOUS BLD VENIPUNCTURE: CPT | Performed by: FAMILY MEDICINE

## 2024-10-24 PROCEDURE — 83036 HEMOGLOBIN GLYCOSYLATED A1C: CPT | Performed by: CLINICAL MEDICAL LABORATORY

## 2024-10-25 LAB — HBA1C MFR BLD: 6.2 % (ref 4.5–5.6)

## 2024-11-04 ENCOUNTER — LAB SERVICES (OUTPATIENT)
Dept: LAB | Age: 69
End: 2024-11-04

## 2024-11-04 DIAGNOSIS — Z12.11 COLON CANCER SCREENING: ICD-10-CM

## 2024-11-04 PROCEDURE — 82274 ASSAY TEST FOR BLOOD FECAL: CPT | Performed by: CLINICAL MEDICAL LABORATORY

## 2024-11-05 DIAGNOSIS — R19.5 OCCULT BLOOD POSITIVE STOOL: Primary | ICD-10-CM

## 2024-11-05 LAB — HEMOCCULT STL QL: POSITIVE

## 2024-11-06 ENCOUNTER — TELEPHONE (OUTPATIENT)
Dept: FAMILY MEDICINE | Age: 69
End: 2024-11-06

## 2024-12-09 ENCOUNTER — IMAGING SERVICES (OUTPATIENT)
Dept: GENERAL RADIOLOGY | Age: 69
End: 2024-12-09
Attending: NURSE PRACTITIONER

## 2024-12-09 ENCOUNTER — WALK IN (OUTPATIENT)
Dept: URGENT CARE | Age: 69
End: 2024-12-09

## 2024-12-09 VITALS
WEIGHT: 167 LBS | OXYGEN SATURATION: 99 % | BODY MASS INDEX: 25.31 KG/M2 | DIASTOLIC BLOOD PRESSURE: 80 MMHG | HEART RATE: 70 BPM | HEIGHT: 68 IN | TEMPERATURE: 97.4 F | SYSTOLIC BLOOD PRESSURE: 138 MMHG | RESPIRATION RATE: 20 BRPM

## 2024-12-09 DIAGNOSIS — J20.9 ACUTE BRONCHITIS, UNSPECIFIED ORGANISM: Primary | ICD-10-CM

## 2024-12-09 DIAGNOSIS — B96.89 BACTERIAL SINUSITIS: ICD-10-CM

## 2024-12-09 DIAGNOSIS — R05.1 ACUTE COUGH: ICD-10-CM

## 2024-12-09 DIAGNOSIS — J32.9 BACTERIAL SINUSITIS: ICD-10-CM

## 2024-12-09 LAB
FLUAV RNA RESP QL NAA+PROBE: NOT DETECTED
FLUBV RNA RESP QL NAA+PROBE: NOT DETECTED
RSV AG NPH QL IA.RAPID: NOT DETECTED
S PYO DNA THROAT QL NAA+PROBE: NOT DETECTED
SARS-COV-2 RNA RESP QL NAA+PROBE: NOT DETECTED
TEST LOT EXPIRATION DATE: NORMAL
TEST LOT NUMBER: NORMAL

## 2024-12-09 PROCEDURE — 71046 X-RAY EXAM CHEST 2 VIEWS: CPT | Performed by: RADIOLOGY

## 2024-12-09 PROCEDURE — 87651 STREP A DNA AMP PROBE: CPT | Performed by: NURSE PRACTITIONER

## 2024-12-09 PROCEDURE — 3079F DIAST BP 80-89 MM HG: CPT | Performed by: NURSE PRACTITIONER

## 2024-12-09 PROCEDURE — 3075F SYST BP GE 130 - 139MM HG: CPT | Performed by: NURSE PRACTITIONER

## 2024-12-09 PROCEDURE — 0241U POCT COVID/FLU/RSV PANEL: CPT | Performed by: NURSE PRACTITIONER

## 2024-12-09 PROCEDURE — 99213 OFFICE O/P EST LOW 20 MIN: CPT | Performed by: NURSE PRACTITIONER

## 2024-12-09 RX ORDER — ALBUTEROL SULFATE 90 UG/1
2 INHALANT RESPIRATORY (INHALATION) 3 TIMES DAILY
Qty: 1 EACH | Refills: 0 | Status: SHIPPED | OUTPATIENT
Start: 2024-12-09

## 2024-12-09 RX ORDER — PREDNISONE 20 MG/1
40 TABLET ORAL DAILY
Qty: 10 TABLET | Refills: 0 | Status: SHIPPED | OUTPATIENT
Start: 2024-12-09 | End: 2024-12-14

## 2024-12-09 RX ORDER — DOXYCYCLINE HYCLATE 100 MG
100 TABLET ORAL 2 TIMES DAILY
Qty: 10 TABLET | Refills: 0 | Status: SHIPPED | OUTPATIENT
Start: 2024-12-09 | End: 2024-12-14

## 2024-12-09 ASSESSMENT — ENCOUNTER SYMPTOMS
COUGH: 1
SORE THROAT: 1

## 2024-12-12 ENCOUNTER — TELEPHONE (OUTPATIENT)
Dept: GASTROENTEROLOGY | Age: 69
End: 2024-12-12

## 2024-12-13 ENCOUNTER — APPOINTMENT (OUTPATIENT)
Dept: GASTROENTEROLOGY | Age: 69
End: 2024-12-13

## 2024-12-13 VITALS — BODY MASS INDEX: 27.81 KG/M2 | WEIGHT: 182.87 LBS

## 2024-12-13 DIAGNOSIS — K62.5 BRIGHT RED RECTAL BLEEDING: Primary | ICD-10-CM

## 2024-12-13 RX ORDER — BISACODYL 5 MG/1
TABLET, DELAYED RELEASE ORAL
Qty: 2 TABLET | Refills: 0 | Status: SHIPPED | OUTPATIENT
Start: 2024-12-13

## 2024-12-13 RX ORDER — POLYETHYLENE GLYCOL 3350, SODIUM CHLORIDE, SODIUM BICARBONATE, POTASSIUM CHLORIDE 420; 11.2; 5.72; 1.48 G/4L; G/4L; G/4L; G/4L
4000 POWDER, FOR SOLUTION ORAL ONCE
Qty: 4000 ML | Refills: 0 | Status: SHIPPED | OUTPATIENT
Start: 2024-12-13 | End: 2024-12-13

## 2024-12-13 RX ORDER — SIMETHICONE 40MG/0.6ML
800 SUSPENSION, DROPS(FINAL DOSAGE FORM)(ML) ORAL ONCE
Qty: 12 ML | Refills: 0 | Status: SHIPPED | OUTPATIENT
Start: 2024-12-13 | End: 2024-12-13

## 2024-12-16 ENCOUNTER — TELEPHONE (OUTPATIENT)
Dept: FAMILY MEDICINE | Age: 69
End: 2024-12-16

## 2024-12-19 ENCOUNTER — APPOINTMENT (OUTPATIENT)
Dept: CARDIOLOGY | Age: 69
End: 2024-12-19

## 2024-12-30 ENCOUNTER — APPOINTMENT (OUTPATIENT)
Dept: FAMILY MEDICINE | Age: 69
End: 2024-12-30

## 2024-12-30 VITALS
RESPIRATION RATE: 17 BRPM | HEIGHT: 68 IN | HEART RATE: 60 BPM | BODY MASS INDEX: 27.2 KG/M2 | SYSTOLIC BLOOD PRESSURE: 162 MMHG | DIASTOLIC BLOOD PRESSURE: 97 MMHG | TEMPERATURE: 96.7 F | WEIGHT: 179.45 LBS | OXYGEN SATURATION: 99 %

## 2024-12-30 DIAGNOSIS — J06.9 ACUTE URI: ICD-10-CM

## 2024-12-30 DIAGNOSIS — J01.01 ACUTE RECURRENT MAXILLARY SINUSITIS: Primary | ICD-10-CM

## 2024-12-30 DIAGNOSIS — I10 PRIMARY HYPERTENSION: ICD-10-CM

## 2024-12-30 ASSESSMENT — ENCOUNTER SYMPTOMS
ALLERGIC/IMMUNOLOGIC NEGATIVE: 1
FACIAL SWELLING: 0
SHORTNESS OF BREATH: 0
NEUROLOGICAL NEGATIVE: 1
PSYCHIATRIC NEGATIVE: 1
SINUS PRESSURE: 1
RHINORRHEA: 1
SINUS PAIN: 0
VOICE CHANGE: 0
CHEST TIGHTNESS: 0
GASTROINTESTINAL NEGATIVE: 1
SORE THROAT: 1
CHOKING: 0
COUGH: 1
EYES NEGATIVE: 1
STRIDOR: 0
CONSTITUTIONAL NEGATIVE: 1
ENDOCRINE NEGATIVE: 1
TROUBLE SWALLOWING: 0
APNEA: 0
HEMATOLOGIC/LYMPHATIC NEGATIVE: 1
WHEEZING: 1

## 2024-12-30 ASSESSMENT — PATIENT HEALTH QUESTIONNAIRE - PHQ9
CLINICAL INTERPRETATION OF PHQ2 SCORE: NO FURTHER SCREENING NEEDED
SUM OF ALL RESPONSES TO PHQ9 QUESTIONS 1 AND 2: 0
2. FEELING DOWN, DEPRESSED OR HOPELESS: NOT AT ALL
SUM OF ALL RESPONSES TO PHQ9 QUESTIONS 1 AND 2: 0
1. LITTLE INTEREST OR PLEASURE IN DOING THINGS: NOT AT ALL

## 2024-12-30 ASSESSMENT — PAIN SCALES - GENERAL: PAINLEVEL: 0

## 2025-01-11 DIAGNOSIS — J20.9 ACUTE BRONCHITIS, UNSPECIFIED ORGANISM: ICD-10-CM

## 2025-01-11 DIAGNOSIS — R05.1 ACUTE COUGH: ICD-10-CM

## 2025-01-12 RX ORDER — ALBUTEROL SULFATE 90 UG/1
AEROSOL, METERED RESPIRATORY (INHALATION)
Qty: 18 G | OUTPATIENT
Start: 2025-01-12

## 2025-01-29 ENCOUNTER — APPOINTMENT (OUTPATIENT)
Dept: CARDIOLOGY | Age: 70
End: 2025-01-29

## 2025-01-30 ENCOUNTER — TELEPHONE (OUTPATIENT)
Dept: GASTROENTEROLOGY | Age: 70
End: 2025-01-30

## 2025-01-31 SDOH — SOCIAL STABILITY: SOCIAL INSECURITY: HOW OFTEN DOES ANYONE, INCLUDING FAMILY AND FRIENDS, SCREAM OR CURSE AT YOU?: NEVER

## 2025-01-31 SDOH — SOCIAL STABILITY: SOCIAL INSECURITY: HOW OFTEN DOES ANYONE, INCLUDING FAMILY AND FRIENDS, PHYSICALLY HURT YOU?: NEVER

## 2025-01-31 SDOH — SOCIAL STABILITY: SOCIAL INSECURITY: HOW OFTEN DOES ANYONE, INCLUDING FAMILY AND FRIENDS, THREATEN YOU WITH HARM?: NEVER

## 2025-01-31 SDOH — SOCIAL STABILITY: SOCIAL INSECURITY: HOW OFTEN DOES ANYONE, INCLUDING FAMILY AND FRIENDS, INSULT OR TALK DOWN TO YOU?: NEVER

## 2025-01-31 ASSESSMENT — ACTIVITIES OF DAILY LIVING (ADL)
HISTORY OF FALLING IN THE LAST YEAR (PRIOR TO ADMISSION): NO
ADL_SCORE: 12
ADL_BEFORE_ADMISSION: INDEPENDENT

## 2025-02-03 ENCOUNTER — HOSPITAL ENCOUNTER (OUTPATIENT)
Dept: GASTROENTEROLOGY | Age: 70
Discharge: HOME | End: 2025-02-03
Attending: STUDENT IN AN ORGANIZED HEALTH CARE EDUCATION/TRAINING PROGRAM

## 2025-02-03 ENCOUNTER — ANESTHESIA (OUTPATIENT)
Dept: GASTROENTEROLOGY | Age: 70
End: 2025-02-03

## 2025-02-03 ENCOUNTER — ANESTHESIA EVENT (OUTPATIENT)
Dept: GASTROENTEROLOGY | Age: 70
End: 2025-02-03

## 2025-02-03 VITALS
HEIGHT: 68 IN | HEART RATE: 67 BPM | TEMPERATURE: 98.1 F | DIASTOLIC BLOOD PRESSURE: 66 MMHG | RESPIRATION RATE: 16 BRPM | SYSTOLIC BLOOD PRESSURE: 143 MMHG | BODY MASS INDEX: 27.06 KG/M2 | OXYGEN SATURATION: 98 % | WEIGHT: 178.57 LBS

## 2025-02-03 DIAGNOSIS — K62.5 BRIGHT RED RECTAL BLEEDING: ICD-10-CM

## 2025-02-03 LAB — GLUCOSE BLDC GLUCOMTR-MCNC: 110 MG/DL (ref 70–99)

## 2025-02-03 PROCEDURE — 13000008 HB ANESTHESIA MAC OUTSIDE OR

## 2025-02-03 PROCEDURE — 13000025 HB GI COMPLEX CASE EACH ADD MINUTE

## 2025-02-03 PROCEDURE — 10006023 HB SUPPLY 272

## 2025-02-03 PROCEDURE — 10002800 HB RX 250 W HCPCS: Performed by: GENERAL ACUTE CARE HOSPITAL

## 2025-02-03 PROCEDURE — 10002807 HB RX 258: Performed by: GENERAL ACUTE CARE HOSPITAL

## 2025-02-03 PROCEDURE — 13000001 HB PHASE II RECOVERY EA 30 MINUTES

## 2025-02-03 PROCEDURE — 88305 TISSUE EXAM BY PATHOLOGIST: CPT | Performed by: STUDENT IN AN ORGANIZED HEALTH CARE EDUCATION/TRAINING PROGRAM

## 2025-02-03 PROCEDURE — C1889 IMPLANT/INSERT DEVICE, NOC: HCPCS

## 2025-02-03 PROCEDURE — 10002807 HB RX 258: Performed by: STUDENT IN AN ORGANIZED HEALTH CARE EDUCATION/TRAINING PROGRAM

## 2025-02-03 PROCEDURE — 13000024 HB GI COMPLEX CASE S/U + 1ST 15 MIN

## 2025-02-03 PROCEDURE — 82962 GLUCOSE BLOOD TEST: CPT

## 2025-02-03 PROCEDURE — 10002801 HB RX 250 W/O HCPCS: Performed by: GENERAL ACUTE CARE HOSPITAL

## 2025-02-03 RX ORDER — SODIUM CHLORIDE 9 MG/ML
INJECTION, SOLUTION INTRAVENOUS CONTINUOUS
Status: DISCONTINUED | OUTPATIENT
Start: 2025-02-03 | End: 2025-02-05 | Stop reason: HOSPADM

## 2025-02-03 RX ORDER — PROPOFOL 10 MG/ML
INJECTION, EMULSION INTRAVENOUS PRN
Status: DISCONTINUED | OUTPATIENT
Start: 2025-02-03 | End: 2025-02-03

## 2025-02-03 RX ORDER — LIDOCAINE HYDROCHLORIDE 20 MG/ML
INJECTION, SOLUTION INFILTRATION; PERINEURAL PRN
Status: DISCONTINUED | OUTPATIENT
Start: 2025-02-03 | End: 2025-02-03

## 2025-02-03 RX ORDER — SODIUM CHLORIDE 9 MG/ML
INJECTION, SOLUTION INTRAVENOUS CONTINUOUS PRN
Status: DISCONTINUED | OUTPATIENT
Start: 2025-02-03 | End: 2025-02-03

## 2025-02-03 RX ADMIN — SODIUM CHLORIDE: 9 INJECTION, SOLUTION INTRAVENOUS at 14:01

## 2025-02-03 RX ADMIN — SODIUM CHLORIDE: 900 INJECTION INTRAVENOUS at 14:46

## 2025-02-03 RX ADMIN — PROPOFOL INJECTABLE EMULSION 140 MCG/KG/MIN: 10 INJECTION, EMULSION INTRAVENOUS at 14:54

## 2025-02-03 RX ADMIN — PROPOFOL 150 MG: 10 INJECTION, EMULSION INTRAVENOUS at 14:54

## 2025-02-03 RX ADMIN — LIDOCAINE HYDROCHLORIDE 40 MG: 20 INJECTION, SOLUTION INFILTRATION; PERINEURAL at 14:54

## 2025-02-03 ASSESSMENT — COPD QUESTIONNAIRES: COPD: 1

## 2025-02-03 ASSESSMENT — ENCOUNTER SYMPTOMS: EXERCISE TOLERANCE: GOOD (>4 METS)

## 2025-02-03 ASSESSMENT — PAIN SCALES - GENERAL: PAINLEVEL_OUTOF10: 0

## 2025-02-06 LAB
ASR DISCLAIMER: NORMAL
CASE RPRT: NORMAL
CLINICAL INFO: NORMAL
PATH REPORT.FINAL DX SPEC: NORMAL
PATH REPORT.GROSS SPEC: NORMAL

## 2025-02-07 ASSESSMENT — ENCOUNTER SYMPTOMS
DIZZINESS: 0
ACTIVITY CHANGE: 0
UNEXPECTED WEIGHT CHANGE: 0
DIAPHORESIS: 0
COUGH: 0
BLOOD IN STOOL: 0
ABDOMINAL PAIN: 0
CHEST TIGHTNESS: 0
APPETITE CHANGE: 0
LIGHT-HEADEDNESS: 0
VOMITING: 0
NAUSEA: 0
WEAKNESS: 0
SPEECH DIFFICULTY: 0
HEADACHES: 0
WHEEZING: 0
BRUISES/BLEEDS EASILY: 0
ABDOMINAL DISTENTION: 0

## 2025-02-10 ENCOUNTER — APPOINTMENT (OUTPATIENT)
Dept: GASTROENTEROLOGY | Age: 70
End: 2025-02-10
Attending: STUDENT IN AN ORGANIZED HEALTH CARE EDUCATION/TRAINING PROGRAM

## 2025-02-11 ENCOUNTER — APPOINTMENT (OUTPATIENT)
Dept: CARDIOLOGY | Age: 70
End: 2025-02-11

## 2025-02-11 VITALS
DIASTOLIC BLOOD PRESSURE: 82 MMHG | BODY MASS INDEX: 27.58 KG/M2 | HEART RATE: 62 BPM | WEIGHT: 182 LBS | SYSTOLIC BLOOD PRESSURE: 146 MMHG | HEIGHT: 68 IN

## 2025-02-11 DIAGNOSIS — Z72.0 TOBACCO USE: ICD-10-CM

## 2025-02-11 DIAGNOSIS — R07.89 ATYPICAL CHEST PAIN: Primary | ICD-10-CM

## 2025-02-11 DIAGNOSIS — I49.3 FREQUENT PVCS: ICD-10-CM

## 2025-02-11 DIAGNOSIS — I73.9 PAD (PERIPHERAL ARTERY DISEASE) (CMD): ICD-10-CM

## 2025-02-11 DIAGNOSIS — I10 PRIMARY HYPERTENSION: ICD-10-CM

## 2025-02-11 DIAGNOSIS — E78.2 MIXED HYPERLIPIDEMIA: ICD-10-CM

## 2025-02-11 PROCEDURE — 3079F DIAST BP 80-89 MM HG: CPT | Performed by: NURSE PRACTITIONER

## 2025-02-11 PROCEDURE — 3077F SYST BP >= 140 MM HG: CPT | Performed by: NURSE PRACTITIONER

## 2025-02-11 PROCEDURE — G2211 COMPLEX E/M VISIT ADD ON: HCPCS | Performed by: NURSE PRACTITIONER

## 2025-02-11 PROCEDURE — 99214 OFFICE O/P EST MOD 30 MIN: CPT | Performed by: NURSE PRACTITIONER

## 2025-02-11 RX ORDER — LOSARTAN POTASSIUM 50 MG/1
50 TABLET ORAL DAILY
Qty: 90 TABLET | Refills: 3 | Status: SHIPPED | OUTPATIENT
Start: 2025-02-11 | End: 2026-02-06

## 2025-02-11 RX ORDER — ATORVASTATIN CALCIUM 80 MG/1
80 TABLET, FILM COATED ORAL DAILY
Qty: 90 TABLET | Refills: 3 | Status: SHIPPED | OUTPATIENT
Start: 2025-02-11 | End: 2026-02-06

## 2025-02-11 ASSESSMENT — ENCOUNTER SYMPTOMS
FATIGUE: 1
SHORTNESS OF BREATH: 1

## 2025-02-14 ENCOUNTER — TELEPHONE (OUTPATIENT)
Dept: GASTROENTEROLOGY | Age: 70
End: 2025-02-14

## 2025-03-03 RX ORDER — METFORMIN HYDROCHLORIDE 500 MG/1
500 TABLET, EXTENDED RELEASE ORAL
Qty: 90 TABLET | Refills: 1 | Status: SHIPPED | OUTPATIENT
Start: 2025-03-03

## 2025-03-10 DIAGNOSIS — F33.0 MILD RECURRENT MAJOR DEPRESSION (CMD): ICD-10-CM

## 2025-03-10 RX ORDER — ESCITALOPRAM OXALATE 10 MG/1
10 TABLET ORAL DAILY
Qty: 90 TABLET | Refills: 1 | Status: SHIPPED | OUTPATIENT
Start: 2025-03-10 | End: 2025-06-08

## 2025-03-20 ENCOUNTER — IMAGING SERVICES (OUTPATIENT)
Dept: GENERAL RADIOLOGY | Age: 70
End: 2025-03-20
Attending: FAMILY MEDICINE

## 2025-03-20 ENCOUNTER — OFFICE VISIT (OUTPATIENT)
Dept: FAMILY MEDICINE | Age: 70
End: 2025-03-20

## 2025-03-20 ENCOUNTER — LAB SERVICES (OUTPATIENT)
Dept: LAB | Age: 70
End: 2025-03-20

## 2025-03-20 VITALS
HEART RATE: 90 BPM | OXYGEN SATURATION: 97 % | HEIGHT: 68 IN | TEMPERATURE: 98.3 F | DIASTOLIC BLOOD PRESSURE: 80 MMHG | WEIGHT: 175.82 LBS | RESPIRATION RATE: 20 BRPM | SYSTOLIC BLOOD PRESSURE: 130 MMHG | BODY MASS INDEX: 26.65 KG/M2

## 2025-03-20 DIAGNOSIS — E78.2 MIXED HYPERLIPIDEMIA: ICD-10-CM

## 2025-03-20 DIAGNOSIS — I10 PRIMARY HYPERTENSION: ICD-10-CM

## 2025-03-20 DIAGNOSIS — J43.8 OTHER EMPHYSEMA  (CMD): ICD-10-CM

## 2025-03-20 DIAGNOSIS — K63.5 MULTIPLE POLYPS OF SIGMOID COLON: ICD-10-CM

## 2025-03-20 DIAGNOSIS — E55.9 VITAMIN D DEFICIENCY: ICD-10-CM

## 2025-03-20 DIAGNOSIS — E11.51 DIABETES MELLITUS WITH PERIPHERAL VASCULAR DISEASE (CMD): ICD-10-CM

## 2025-03-20 DIAGNOSIS — M79.641 CHRONIC HAND PAIN, RIGHT: ICD-10-CM

## 2025-03-20 DIAGNOSIS — Z53.20 LUNG CANCER SCREENING DECLINED BY PATIENT: ICD-10-CM

## 2025-03-20 DIAGNOSIS — G89.29 CHRONIC HAND PAIN, RIGHT: ICD-10-CM

## 2025-03-20 DIAGNOSIS — F17.200 SMOKER: ICD-10-CM

## 2025-03-20 DIAGNOSIS — E11.51 DIABETES MELLITUS WITH PERIPHERAL VASCULAR DISEASE (CMD): Primary | ICD-10-CM

## 2025-03-20 DIAGNOSIS — F33.0 MILD RECURRENT MAJOR DEPRESSION (CMD): ICD-10-CM

## 2025-03-20 PROBLEM — E11.69 ERECTILE DYSFUNCTION DUE TO DIABETES MELLITUS  (CMD): Chronic | Status: RESOLVED | Noted: 2022-06-09 | Resolved: 2025-03-20

## 2025-03-20 PROBLEM — N52.1 ERECTILE DYSFUNCTION DUE TO DIABETES MELLITUS  (CMD): Chronic | Status: RESOLVED | Noted: 2022-06-09 | Resolved: 2025-03-20

## 2025-03-20 LAB
25(OH)D3+25(OH)D2 SERPL-MCNC: 19.5 NG/ML (ref 30–100)
BASOPHILS # BLD: 0.1 K/MCL (ref 0–0.3)
BASOPHILS NFR BLD: 1 %
DEPRECATED RDW RBC: 48.3 FL (ref 39–50)
EOSINOPHIL # BLD: 0.2 K/MCL (ref 0–0.5)
EOSINOPHIL NFR BLD: 3 %
ERYTHROCYTE [DISTWIDTH] IN BLOOD: 13.9 % (ref 11–15)
HBA1C MFR BLD: 6.8 % (ref 4.5–5.6)
HCT VFR BLD CALC: 49.1 % (ref 39–51)
HGB BLD-MCNC: 16.6 G/DL (ref 13–17)
IMM GRANULOCYTES # BLD AUTO: 0.1 K/MCL (ref 0–0.2)
IMM GRANULOCYTES # BLD: 1 %
LYMPHOCYTES # BLD: 1.8 K/MCL (ref 1–4)
LYMPHOCYTES NFR BLD: 20 %
MCH RBC QN AUTO: 32 PG (ref 26–34)
MCHC RBC AUTO-ENTMCNC: 33.8 G/DL (ref 32–36.5)
MCV RBC AUTO: 94.6 FL (ref 78–100)
MONOCYTES # BLD: 0.7 K/MCL (ref 0.3–0.9)
MONOCYTES NFR BLD: 7 %
NEUTROPHILS # BLD: 6.4 K/MCL (ref 1.8–7.7)
NEUTROPHILS NFR BLD: 68 %
NRBC BLD MANUAL-RTO: 0 /100 WBC
PLATELET # BLD AUTO: 254 K/MCL (ref 140–450)
PSA SERPL-MCNC: 2.24 NG/ML
RBC # BLD: 5.19 MIL/MCL (ref 4.5–5.9)
TSH SERPL-ACNC: 0.95 MCUNITS/ML (ref 0.35–5)
VIT B12 SERPL-MCNC: 531 PG/ML (ref 211–911)
WBC # BLD: 9.2 K/MCL (ref 4.2–11)

## 2025-03-20 PROCEDURE — 84153 ASSAY OF PSA TOTAL: CPT | Performed by: CLINICAL MEDICAL LABORATORY

## 2025-03-20 PROCEDURE — 83036 HEMOGLOBIN GLYCOSYLATED A1C: CPT | Performed by: CLINICAL MEDICAL LABORATORY

## 2025-03-20 PROCEDURE — 84443 ASSAY THYROID STIM HORMONE: CPT | Performed by: CLINICAL MEDICAL LABORATORY

## 2025-03-20 PROCEDURE — 82306 VITAMIN D 25 HYDROXY: CPT | Performed by: CLINICAL MEDICAL LABORATORY

## 2025-03-20 PROCEDURE — 36415 COLL VENOUS BLD VENIPUNCTURE: CPT | Performed by: FAMILY MEDICINE

## 2025-03-20 PROCEDURE — 82607 VITAMIN B-12: CPT | Performed by: CLINICAL MEDICAL LABORATORY

## 2025-03-20 PROCEDURE — 73130 X-RAY EXAM OF HAND: CPT | Performed by: FAMILY MEDICINE

## 2025-03-20 PROCEDURE — 85025 COMPLETE CBC W/AUTO DIFF WBC: CPT | Performed by: CLINICAL MEDICAL LABORATORY

## 2025-03-20 ASSESSMENT — ENCOUNTER SYMPTOMS
FATIGUE: 0
DIZZINESS: 0
BRUISES/BLEEDS EASILY: 0
ENDOCRINE NEGATIVE: 1
EYES NEGATIVE: 1
SHORTNESS OF BREATH: 0
ABDOMINAL PAIN: 0

## 2025-03-20 ASSESSMENT — MINI COG
PATIENT ABLE TO REPEAT THE 3 WORDS GIVEN PREVIOUSLY?: WAS ABLE TO REPEAT BACK 3 WORDS CORRECTLY
TOTAL SCORE: 5
PATIENT ABLE TO FILL IN THE CLOCK FACE WITH 10 MINUTES PAST 11 O'CLOCK?: YES, CLOCK IS CORRECT
PATIENT WAS GIVEN REPEAT BACK WORDS FROM VERSION: 1 - BANANA SUNRISE CHAIR

## 2025-03-20 ASSESSMENT — COGNITIVE AND FUNCTIONAL STATUS - GENERAL
DO YOU HAVE DIFFICULTY DRESSING OR BATHING: NO
BECAUSE OF A PHYSICAL, MENTAL, OR EMOTIONAL CONDITION, DO YOU HAVE SERIOUS DIFFICULTY CONCENTRATING, REMEMBERING OR MAKING DECISIONS: YES
DO YOU HAVE SERIOUS DIFFICULTY WALKING OR CLIMBING STAIRS: NO
BECAUSE OF A PHYSICAL, MENTAL, OR EMOTIONAL CONDITION, DO YOU HAVE DIFFICULTY DOING ERRANDS ALONE: NO

## 2025-03-20 ASSESSMENT — PATIENT HEALTH QUESTIONNAIRE - PHQ9
2. FEELING DOWN, DEPRESSED OR HOPELESS: MORE THAN HALF THE DAYS
SUM OF ALL RESPONSES TO PHQ9 QUESTIONS 1 AND 2: 2
CLINICAL INTERPRETATION OF PHQ2 SCORE: NO FURTHER SCREENING NEEDED
SUM OF ALL RESPONSES TO PHQ9 QUESTIONS 1 AND 2: 2
1. LITTLE INTEREST OR PLEASURE IN DOING THINGS: NOT AT ALL

## 2025-03-20 ASSESSMENT — PAIN SCALES - GENERAL: PAINLEVEL: 4

## 2025-03-21 ENCOUNTER — TELEPHONE (OUTPATIENT)
Dept: BEHAVIORAL HEALTH | Age: 70
End: 2025-03-21

## 2025-03-26 ENCOUNTER — TELEPHONE (OUTPATIENT)
Dept: FAMILY MEDICINE | Age: 70
End: 2025-03-26

## 2025-04-22 ENCOUNTER — APPOINTMENT (OUTPATIENT)
Dept: PULMONOLOGY | Age: 70
End: 2025-04-22

## 2025-04-22 VITALS
WEIGHT: 173 LBS | SYSTOLIC BLOOD PRESSURE: 152 MMHG | OXYGEN SATURATION: 97 % | DIASTOLIC BLOOD PRESSURE: 80 MMHG | BODY MASS INDEX: 26.3 KG/M2 | HEART RATE: 70 BPM

## 2025-04-22 DIAGNOSIS — R05.9 COUGH, UNSPECIFIED TYPE: ICD-10-CM

## 2025-04-22 DIAGNOSIS — F17.200 SMOKER: ICD-10-CM

## 2025-04-22 DIAGNOSIS — J44.9 CHRONIC OBSTRUCTIVE PULMONARY DISEASE, UNSPECIFIED COPD TYPE  (CMD): Primary | ICD-10-CM

## 2025-04-22 ASSESSMENT — ENCOUNTER SYMPTOMS
SORE THROAT: 0
CHILLS: 0
SHORTNESS OF BREATH: 0
COUGH: 1
VOICE CHANGE: 0
ACTIVITY CHANGE: 0
FATIGUE: 0
DIAPHORESIS: 0
RHINORRHEA: 0
WOUND: 0
FEVER: 0
APNEA: 0
TROUBLE SWALLOWING: 0
CHEST TIGHTNESS: 0
UNEXPECTED WEIGHT CHANGE: 0
APPETITE CHANGE: 0
WHEEZING: 0
ABDOMINAL PAIN: 0
SINUS PAIN: 0
CHOKING: 0
ABDOMINAL DISTENTION: 0
STRIDOR: 0
SINUS PRESSURE: 0

## 2025-05-07 ENCOUNTER — TELEPHONE (OUTPATIENT)
Dept: FAMILY MEDICINE | Age: 70
End: 2025-05-07

## 2025-05-09 ENCOUNTER — HOSPITAL ENCOUNTER (EMERGENCY)
Age: 70
Discharge: HOME | End: 2025-05-09
Attending: EMERGENCY MEDICINE

## 2025-05-09 ENCOUNTER — APPOINTMENT (OUTPATIENT)
Dept: GENERAL RADIOLOGY | Age: 70
End: 2025-05-09
Attending: EMERGENCY MEDICINE

## 2025-05-09 VITALS
HEART RATE: 56 BPM | OXYGEN SATURATION: 97 % | RESPIRATION RATE: 20 BRPM | TEMPERATURE: 97.6 F | SYSTOLIC BLOOD PRESSURE: 167 MMHG | DIASTOLIC BLOOD PRESSURE: 87 MMHG

## 2025-05-09 DIAGNOSIS — K21.9 GASTROESOPHAGEAL REFLUX DISEASE, UNSPECIFIED WHETHER ESOPHAGITIS PRESENT: ICD-10-CM

## 2025-05-09 DIAGNOSIS — F41.9 ANXIETY: Primary | ICD-10-CM

## 2025-05-09 LAB
ALBUMIN SERPL-MCNC: 3.7 G/DL (ref 3.4–5)
ALBUMIN/GLOB SERPL: 1.2 {RATIO} (ref 1–2.4)
ALP SERPL-CCNC: 71 UNITS/L (ref 45–117)
ALT SERPL-CCNC: 37 UNITS/L
ANION GAP SERPL CALC-SCNC: 13 MMOL/L (ref 7–19)
AST SERPL-CCNC: 18 UNITS/L
ATRIAL RATE (BPM): 57
BASOPHILS # BLD: 0.1 K/MCL (ref 0–0.3)
BASOPHILS NFR BLD: 1 %
BILIRUB SERPL-MCNC: 1 MG/DL (ref 0.2–1)
BUN SERPL-MCNC: 12 MG/DL (ref 6–20)
BUN/CREAT SERPL: 18 (ref 7–25)
CALCIUM SERPL-MCNC: 9.2 MG/DL (ref 8.4–10.2)
CHLORIDE SERPL-SCNC: 101 MMOL/L (ref 97–110)
CO2 SERPL-SCNC: 24 MMOL/L (ref 21–32)
CREAT SERPL-MCNC: 0.66 MG/DL (ref 0.67–1.17)
D DIMER PPP FEU-MCNC: 0.34 MG/L (FEU)
DEPRECATED RDW RBC: 47.7 FL (ref 39–50)
EGFRCR SERPLBLD CKD-EPI 2021: >90 ML/MIN/{1.73_M2}
EOSINOPHIL # BLD: 0.1 K/MCL (ref 0–0.5)
EOSINOPHIL NFR BLD: 1 %
ERYTHROCYTE [DISTWIDTH] IN BLOOD: 13.6 % (ref 11–15)
FASTING DURATION TIME PATIENT: ABNORMAL H
GLOBULIN SER-MCNC: 3.1 G/DL (ref 2–4)
GLUCOSE SERPL-MCNC: 136 MG/DL (ref 70–99)
HCT VFR BLD CALC: 45.8 % (ref 39–51)
HGB BLD-MCNC: 15.4 G/DL (ref 13–17)
IMM GRANULOCYTES # BLD AUTO: 0 K/MCL (ref 0–0.2)
IMM GRANULOCYTES # BLD: 0 %
LIPASE SERPL-CCNC: 28 UNITS/L (ref 15–77)
LYMPHOCYTES # BLD: 1.8 K/MCL (ref 1–4)
LYMPHOCYTES NFR BLD: 18 %
MAGNESIUM SERPL-MCNC: 1.9 MG/DL (ref 1.7–2.4)
MCH RBC QN AUTO: 32 PG (ref 26–34)
MCHC RBC AUTO-ENTMCNC: 33.6 G/DL (ref 32–36.5)
MCV RBC AUTO: 95.2 FL (ref 78–100)
MONOCYTES # BLD: 0.8 K/MCL (ref 0.3–0.9)
MONOCYTES NFR BLD: 8 %
NEUTROPHILS # BLD: 7 K/MCL (ref 1.8–7.7)
NEUTROPHILS NFR BLD: 72 %
NRBC BLD MANUAL-RTO: 0 /100 WBC
NT-PROBNP SERPL-MCNC: 237 PG/ML
P AXIS (DEGREES): 41
PLATELET # BLD AUTO: 209 K/MCL (ref 140–450)
POTASSIUM SERPL-SCNC: 3.9 MMOL/L (ref 3.4–5.1)
PR-INTERVAL (MSEC): 168
PROT SERPL-MCNC: 6.8 G/DL (ref 6.4–8.2)
QRS-INTERVAL (MSEC): 82
QT-INTERVAL (MSEC): 404
QTC: 393
R AXIS (DEGREES): 24
RAINBOW EXTRA TUBES HOLD SPECIMEN: NORMAL
RAINBOW EXTRA TUBES HOLD SPECIMEN: NORMAL
RBC # BLD: 4.81 MIL/MCL (ref 4.5–5.9)
REPORT TEXT: NORMAL
SODIUM SERPL-SCNC: 134 MMOL/L (ref 135–145)
T AXIS (DEGREES): 75
TROPONIN I SERPL DL<=0.01 NG/ML-MCNC: 11 NG/L
VENTRICULAR RATE EKG/MIN (BPM): 57
WBC # BLD: 9.8 K/MCL (ref 4.2–11)

## 2025-05-09 PROCEDURE — 85025 COMPLETE CBC W/AUTO DIFF WBC: CPT | Performed by: EMERGENCY MEDICINE

## 2025-05-09 PROCEDURE — 96374 THER/PROPH/DIAG INJ IV PUSH: CPT

## 2025-05-09 PROCEDURE — 71046 X-RAY EXAM CHEST 2 VIEWS: CPT

## 2025-05-09 PROCEDURE — 84484 ASSAY OF TROPONIN QUANT: CPT | Performed by: EMERGENCY MEDICINE

## 2025-05-09 PROCEDURE — 80053 COMPREHEN METABOLIC PANEL: CPT | Performed by: EMERGENCY MEDICINE

## 2025-05-09 PROCEDURE — 99285 EMERGENCY DEPT VISIT HI MDM: CPT | Performed by: EMERGENCY MEDICINE

## 2025-05-09 PROCEDURE — 93010 ELECTROCARDIOGRAM REPORT: CPT | Performed by: INTERNAL MEDICINE

## 2025-05-09 PROCEDURE — T1015 CLINIC SERVICE: HCPCS | Performed by: PHYSICIAN ASSISTANT

## 2025-05-09 PROCEDURE — 83880 ASSAY OF NATRIURETIC PEPTIDE: CPT | Performed by: EMERGENCY MEDICINE

## 2025-05-09 PROCEDURE — 10002800 HB RX 250 W HCPCS: Performed by: PHYSICIAN ASSISTANT

## 2025-05-09 PROCEDURE — 93005 ELECTROCARDIOGRAM TRACING: CPT | Performed by: EMERGENCY MEDICINE

## 2025-05-09 PROCEDURE — 83690 ASSAY OF LIPASE: CPT | Performed by: PHYSICIAN ASSISTANT

## 2025-05-09 PROCEDURE — 99284 EMERGENCY DEPT VISIT MOD MDM: CPT | Performed by: EMERGENCY MEDICINE

## 2025-05-09 PROCEDURE — 83735 ASSAY OF MAGNESIUM: CPT | Performed by: EMERGENCY MEDICINE

## 2025-05-09 PROCEDURE — 10002803 HB RX 637: Performed by: EMERGENCY MEDICINE

## 2025-05-09 PROCEDURE — 85379 FIBRIN DEGRADATION QUANT: CPT | Performed by: PHYSICIAN ASSISTANT

## 2025-05-09 RX ORDER — FAMOTIDINE 10 MG/ML
20 INJECTION, SOLUTION INTRAVENOUS ONCE
Status: COMPLETED | OUTPATIENT
Start: 2025-05-09 | End: 2025-05-09

## 2025-05-09 RX ORDER — FOLIC ACID 1 MG/1
1 TABLET ORAL ONCE
Status: COMPLETED | OUTPATIENT
Start: 2025-05-09 | End: 2025-05-09

## 2025-05-09 RX ADMIN — FAMOTIDINE 20 MG: 10 INJECTION INTRAVENOUS at 13:19

## 2025-05-09 RX ADMIN — FOLIC ACID 1 MG: 1 TABLET ORAL at 13:19

## 2025-05-09 RX ADMIN — Medication 100 MG: at 13:19

## 2025-05-09 ASSESSMENT — ENCOUNTER SYMPTOMS
DIARRHEA: 0
CHEST TIGHTNESS: 0
COUGH: 1
RHINORRHEA: 1
CONSTIPATION: 1
APPETITE CHANGE: 0
HEADACHES: 0
VOMITING: 0
NAUSEA: 1
SORE THROAT: 0
FEVER: 0
SHORTNESS OF BREATH: 1
WOUND: 0
TROUBLE SWALLOWING: 0
NUMBNESS: 0
DIAPHORESIS: 0
WEAKNESS: 0
BACK PAIN: 1
ABDOMINAL PAIN: 1
ACTIVITY CHANGE: 0

## 2025-05-09 ASSESSMENT — PAIN SCALES - GENERAL: PAINLEVEL_OUTOF10: 8

## 2025-05-13 ENCOUNTER — APPOINTMENT (OUTPATIENT)
Dept: FAMILY MEDICINE | Age: 70
End: 2025-05-13

## 2025-05-13 VITALS
WEIGHT: 165 LBS | SYSTOLIC BLOOD PRESSURE: 130 MMHG | HEIGHT: 68 IN | RESPIRATION RATE: 18 BRPM | DIASTOLIC BLOOD PRESSURE: 70 MMHG | HEART RATE: 77 BPM | BODY MASS INDEX: 25.01 KG/M2 | OXYGEN SATURATION: 96 % | TEMPERATURE: 97.8 F

## 2025-05-13 DIAGNOSIS — E78.2 MIXED HYPERLIPIDEMIA: ICD-10-CM

## 2025-05-13 DIAGNOSIS — E11.9 CONTROLLED TYPE 2 DIABETES MELLITUS WITHOUT COMPLICATION, WITHOUT LONG-TERM CURRENT USE OF INSULIN (CMD): Primary | ICD-10-CM

## 2025-05-13 DIAGNOSIS — F41.9 ANXIETY: ICD-10-CM

## 2025-05-13 DIAGNOSIS — F43.21 GRIEF: ICD-10-CM

## 2025-05-13 DIAGNOSIS — R09.89 CHEST CONGESTION: ICD-10-CM

## 2025-05-13 DIAGNOSIS — F33.0 MILD RECURRENT MAJOR DEPRESSION (CMD): ICD-10-CM

## 2025-05-13 DIAGNOSIS — K63.5 MULTIPLE POLYPS OF SIGMOID COLON: ICD-10-CM

## 2025-05-13 DIAGNOSIS — I10 PRIMARY HYPERTENSION: ICD-10-CM

## 2025-05-13 DIAGNOSIS — J43.8 OTHER EMPHYSEMA  (CMD): ICD-10-CM

## 2025-05-13 DIAGNOSIS — R63.4 WEIGHT LOSS: ICD-10-CM

## 2025-05-13 DIAGNOSIS — F17.200 SMOKER: ICD-10-CM

## 2025-05-13 DIAGNOSIS — I73.9 PAD (PERIPHERAL ARTERY DISEASE) (CMD): ICD-10-CM

## 2025-05-13 PROBLEM — R19.5 OCCULT BLOOD POSITIVE STOOL: Status: RESOLVED | Noted: 2024-11-05 | Resolved: 2025-05-13

## 2025-05-13 PROBLEM — Z12.11 COLON CANCER SCREENING: Status: RESOLVED | Noted: 2024-06-10 | Resolved: 2025-05-13

## 2025-05-13 PROBLEM — Z53.20 LUNG CANCER SCREENING DECLINED BY PATIENT: Status: RESOLVED | Noted: 2025-03-20 | Resolved: 2025-05-13

## 2025-05-13 PROCEDURE — 3078F DIAST BP <80 MM HG: CPT | Performed by: FAMILY MEDICINE

## 2025-05-13 PROCEDURE — 3075F SYST BP GE 130 - 139MM HG: CPT | Performed by: FAMILY MEDICINE

## 2025-05-13 PROCEDURE — 99214 OFFICE O/P EST MOD 30 MIN: CPT | Performed by: FAMILY MEDICINE

## 2025-05-13 RX ORDER — METHYLPREDNISOLONE 4 MG/1
4 TABLET ORAL SEE ADMIN INSTRUCTIONS
Qty: 21 TABLET | Refills: 0 | Status: SHIPPED | OUTPATIENT
Start: 2025-05-13

## 2025-05-13 RX ORDER — ALBUTEROL SULFATE 90 UG/1
2 INHALANT RESPIRATORY (INHALATION) 3 TIMES DAILY
Qty: 1 EACH | Refills: 6 | Status: SHIPPED | OUTPATIENT
Start: 2025-05-13

## 2025-05-13 RX ORDER — ESCITALOPRAM OXALATE 20 MG/1
20 TABLET ORAL DAILY
Qty: 30 TABLET | Refills: 5 | Status: SHIPPED | OUTPATIENT
Start: 2025-05-13

## 2025-05-13 RX ORDER — LEVOFLOXACIN 500 MG/1
500 TABLET, FILM COATED ORAL DAILY
Qty: 7 TABLET | Refills: 0 | Status: SHIPPED | OUTPATIENT
Start: 2025-05-13 | End: 2025-05-20

## 2025-05-13 ASSESSMENT — PATIENT HEALTH QUESTIONNAIRE - PHQ9
2. FEELING DOWN, DEPRESSED OR HOPELESS: NOT AT ALL
SUM OF ALL RESPONSES TO PHQ9 QUESTIONS 1 AND 2: 0
1. LITTLE INTEREST OR PLEASURE IN DOING THINGS: NOT AT ALL
CLINICAL INTERPRETATION OF PHQ2 SCORE: NO FURTHER SCREENING NEEDED
SUM OF ALL RESPONSES TO PHQ9 QUESTIONS 1 AND 2: 0

## 2025-05-13 ASSESSMENT — PAIN SCALES - GENERAL: PAINLEVEL_OUTOF10: 0

## 2025-05-13 ASSESSMENT — ENCOUNTER SYMPTOMS
DIZZINESS: 0
EYES NEGATIVE: 1
NERVOUS/ANXIOUS: 1
COUGH: 1
UNEXPECTED WEIGHT CHANGE: 1
ABDOMINAL PAIN: 0
ENDOCRINE NEGATIVE: 1
BRUISES/BLEEDS EASILY: 0

## 2025-05-14 ENCOUNTER — TELEPHONE (OUTPATIENT)
Dept: BEHAVIORAL HEALTH | Age: 70
End: 2025-05-14

## 2025-05-21 ENCOUNTER — LAB SERVICES (OUTPATIENT)
Dept: LAB | Age: 70
End: 2025-05-21

## 2025-05-21 DIAGNOSIS — E11.9 CONTROLLED TYPE 2 DIABETES MELLITUS WITHOUT COMPLICATION, WITHOUT LONG-TERM CURRENT USE OF INSULIN (CMD): ICD-10-CM

## 2025-05-21 LAB
ALBUMIN SERPL-MCNC: 3.7 G/DL (ref 3.4–5)
ALBUMIN/GLOB SERPL: 1.3 {RATIO} (ref 1–2.4)
ALP SERPL-CCNC: 79 UNITS/L (ref 45–117)
ALT SERPL-CCNC: 76 UNITS/L
ANION GAP SERPL CALC-SCNC: 8 MMOL/L (ref 7–19)
AST SERPL-CCNC: 30 UNITS/L
BILIRUB SERPL-MCNC: 0.3 MG/DL (ref 0.2–1)
BUN SERPL-MCNC: 16 MG/DL (ref 6–20)
BUN/CREAT SERPL: 19 (ref 7–25)
CALCIUM SERPL-MCNC: 9.1 MG/DL (ref 8.4–10.2)
CHLORIDE SERPL-SCNC: 108 MMOL/L (ref 97–110)
CHOLEST SERPL-MCNC: 148 MG/DL
CHOLEST/HDLC SERPL: 2.6 {RATIO}
CO2 SERPL-SCNC: 28 MMOL/L (ref 21–32)
CREAT SERPL-MCNC: 0.84 MG/DL (ref 0.67–1.17)
EGFRCR SERPLBLD CKD-EPI 2021: >90 ML/MIN/{1.73_M2}
FASTING DURATION TIME PATIENT: ABNORMAL H
GLOBULIN SER-MCNC: 2.8 G/DL (ref 2–4)
GLUCOSE SERPL-MCNC: 153 MG/DL (ref 70–99)
HDLC SERPL-MCNC: 56 MG/DL
LDLC SERPL CALC-MCNC: 61 MG/DL
NONHDLC SERPL-MCNC: 92 MG/DL
POTASSIUM SERPL-SCNC: 4.9 MMOL/L (ref 3.4–5.1)
PROT SERPL-MCNC: 6.5 G/DL (ref 6.4–8.2)
SODIUM SERPL-SCNC: 139 MMOL/L (ref 135–145)
TRIGL SERPL-MCNC: 153 MG/DL

## 2025-05-22 ENCOUNTER — RESULTS FOLLOW-UP (OUTPATIENT)
Dept: FAMILY MEDICINE | Age: 70
End: 2025-05-22

## 2025-05-22 DIAGNOSIS — R74.01 ELEVATED ALT MEASUREMENT: Primary | ICD-10-CM

## 2025-05-22 LAB
ANNOTATION COMMENT IMP: NORMAL
APPEARANCE UR: CLEAR
BILIRUB UR QL STRIP: NEGATIVE
COLOR UR: NORMAL
CREAT UR-MCNC: 96.1 MG/DL
GLUCOSE UR STRIP-MCNC: NEGATIVE MG/DL
HAV IGM SER QL: NEGATIVE
HBV CORE IGM SER QL: NEGATIVE
HBV SURFACE AG SER QL: NEGATIVE
HCV AB SER QL: NEGATIVE
HGB UR QL STRIP: NEGATIVE
IMP & REVIEW OF LAB RESULTS: NORMAL
KETONES UR STRIP-MCNC: NEGATIVE MG/DL
LEUKOCYTE ESTERASE UR QL STRIP: NEGATIVE
MICROALBUMIN UR-MCNC: 3.72 MG/DL
MICROALBUMIN/CREAT UR: 38.7 MG/G
NITRITE UR QL STRIP: NEGATIVE
PH UR STRIP: 6.5 [PH] (ref 5–7)
PROT UR STRIP-MCNC: NEGATIVE MG/DL
SP GR UR STRIP: 1.02 (ref 1–1.03)
UROBILINOGEN UR STRIP-MCNC: 0.2 MG/DL

## 2025-05-27 ENCOUNTER — NURSE TRIAGE (OUTPATIENT)
Dept: TELEHEALTH | Age: 70
End: 2025-05-27

## 2025-05-27 DIAGNOSIS — R09.89 CHEST CONGESTION: Primary | ICD-10-CM

## 2025-05-27 RX ORDER — DOXYCYCLINE 100 MG/1
100 CAPSULE ORAL 2 TIMES DAILY
Qty: 14 CAPSULE | Refills: 0 | Status: SHIPPED | OUTPATIENT
Start: 2025-05-27 | End: 2025-06-03

## 2025-05-28 ENCOUNTER — TELEPHONE (OUTPATIENT)
Dept: FAMILY MEDICINE | Age: 70
End: 2025-05-28

## 2025-05-28 RX ORDER — METFORMIN HYDROCHLORIDE 500 MG/1
500 TABLET, EXTENDED RELEASE ORAL
Qty: 90 TABLET | Refills: 1 | Status: SHIPPED | OUTPATIENT
Start: 2025-05-28

## 2025-06-11 ENCOUNTER — APPOINTMENT (OUTPATIENT)
Dept: FAMILY MEDICINE | Age: 70
End: 2025-06-11

## 2025-06-11 ENCOUNTER — HOSPITAL ENCOUNTER (OUTPATIENT)
Dept: CT IMAGING | Age: 70
Discharge: HOME | End: 2025-06-11
Attending: FAMILY MEDICINE

## 2025-06-11 VITALS
WEIGHT: 169 LBS | BODY MASS INDEX: 25.61 KG/M2 | TEMPERATURE: 97.5 F | HEIGHT: 68 IN | HEART RATE: 71 BPM | SYSTOLIC BLOOD PRESSURE: 138 MMHG | DIASTOLIC BLOOD PRESSURE: 70 MMHG | RESPIRATION RATE: 17 BRPM | OXYGEN SATURATION: 96 %

## 2025-06-11 DIAGNOSIS — I10 PRIMARY HYPERTENSION: ICD-10-CM

## 2025-06-11 DIAGNOSIS — M54.16 LUMBAR RADICULOPATHY: ICD-10-CM

## 2025-06-11 DIAGNOSIS — R74.01 ELEVATED ALT MEASUREMENT: ICD-10-CM

## 2025-06-11 DIAGNOSIS — F17.200 SMOKER: ICD-10-CM

## 2025-06-11 DIAGNOSIS — G89.29 CHRONIC HAND PAIN, RIGHT: ICD-10-CM

## 2025-06-11 DIAGNOSIS — F41.9 ANXIETY: ICD-10-CM

## 2025-06-11 DIAGNOSIS — I73.9 PAD (PERIPHERAL ARTERY DISEASE) (CMD): ICD-10-CM

## 2025-06-11 DIAGNOSIS — E11.51 DIABETES MELLITUS WITH PERIPHERAL VASCULAR DISEASE (CMD): Primary | ICD-10-CM

## 2025-06-11 DIAGNOSIS — F33.0 MILD RECURRENT MAJOR DEPRESSION (CMD): ICD-10-CM

## 2025-06-11 DIAGNOSIS — E78.2 MIXED HYPERLIPIDEMIA: ICD-10-CM

## 2025-06-11 DIAGNOSIS — J43.8 OTHER EMPHYSEMA  (CMD): ICD-10-CM

## 2025-06-11 DIAGNOSIS — M79.641 CHRONIC HAND PAIN, RIGHT: ICD-10-CM

## 2025-06-11 PROCEDURE — 71271 CT THORAX LUNG CANCER SCR C-: CPT

## 2025-06-11 ASSESSMENT — ENCOUNTER SYMPTOMS
NUMBNESS: 1
ENDOCRINE NEGATIVE: 1
COUGH: 1
WEAKNESS: 1
ABDOMINAL PAIN: 0
NERVOUS/ANXIOUS: 1
WOUND: 0
FATIGUE: 1
EYE PAIN: 0

## 2025-06-11 ASSESSMENT — PATIENT HEALTH QUESTIONNAIRE - PHQ9
SUM OF ALL RESPONSES TO PHQ9 QUESTIONS 1 AND 2: 0
1. LITTLE INTEREST OR PLEASURE IN DOING THINGS: NOT AT ALL
CLINICAL INTERPRETATION OF PHQ2 SCORE: NO FURTHER SCREENING NEEDED
2. FEELING DOWN, DEPRESSED OR HOPELESS: NOT AT ALL
SUM OF ALL RESPONSES TO PHQ9 QUESTIONS 1 AND 2: 0

## 2025-06-11 ASSESSMENT — PAIN SCALES - GENERAL: PAINLEVEL_OUTOF10: 0

## 2025-06-12 ENCOUNTER — HOSPITAL ENCOUNTER (OUTPATIENT)
Dept: ULTRASOUND IMAGING | Age: 70
Discharge: HOME | End: 2025-06-12
Attending: FAMILY MEDICINE

## 2025-06-12 ENCOUNTER — RESULTS FOLLOW-UP (OUTPATIENT)
Dept: FAMILY MEDICINE | Age: 70
End: 2025-06-12

## 2025-06-12 DIAGNOSIS — R74.01 ELEVATED ALT MEASUREMENT: ICD-10-CM

## 2025-06-12 DIAGNOSIS — J43.8 OTHER EMPHYSEMA  (CMD): Primary | ICD-10-CM

## 2025-06-12 PROCEDURE — 76705 ECHO EXAM OF ABDOMEN: CPT

## 2025-06-12 RX ORDER — FLUTICASONE PROPIONATE AND SALMETEROL 250; 50 UG/1; UG/1
1 POWDER RESPIRATORY (INHALATION)
Qty: 1 EACH | Refills: 5 | Status: SHIPPED | OUTPATIENT
Start: 2025-06-12

## 2025-07-23 ENCOUNTER — NURSE TRIAGE (OUTPATIENT)
Dept: TELEHEALTH | Age: 70
End: 2025-07-23

## 2025-07-24 ENCOUNTER — APPOINTMENT (OUTPATIENT)
Dept: GENERAL RADIOLOGY | Age: 70
End: 2025-07-24
Attending: EMERGENCY MEDICINE

## 2025-07-24 ENCOUNTER — HOSPITAL ENCOUNTER (EMERGENCY)
Age: 70
Discharge: HOME | End: 2025-07-24
Attending: EMERGENCY MEDICINE

## 2025-07-24 VITALS
HEART RATE: 71 BPM | RESPIRATION RATE: 16 BRPM | TEMPERATURE: 97.9 F | OXYGEN SATURATION: 100 % | DIASTOLIC BLOOD PRESSURE: 82 MMHG | SYSTOLIC BLOOD PRESSURE: 180 MMHG

## 2025-07-24 DIAGNOSIS — J44.1 COPD EXACERBATION  (CMD): Primary | ICD-10-CM

## 2025-07-24 LAB
ALBUMIN SERPL-MCNC: 4 G/DL (ref 3.4–5)
ALBUMIN/GLOB SERPL: 1.3 {RATIO} (ref 1–2.4)
ALP SERPL-CCNC: 88 UNITS/L (ref 45–117)
ALT SERPL-CCNC: 40 UNITS/L
ANION GAP SERPL CALC-SCNC: 18 MMOL/L (ref 7–19)
AST SERPL-CCNC: 17 UNITS/L
ATRIAL RATE (BPM): 60
BASOPHILS # BLD: 0.1 K/MCL (ref 0–0.3)
BASOPHILS NFR BLD: 1 %
BILIRUB SERPL-MCNC: 0.9 MG/DL (ref 0.2–1)
BUN SERPL-MCNC: 18 MG/DL (ref 6–20)
BUN/CREAT SERPL: 22 (ref 7–25)
CALCIUM SERPL-MCNC: 9.9 MG/DL (ref 8.4–10.2)
CHLORIDE SERPL-SCNC: 104 MMOL/L (ref 97–110)
CO2 SERPL-SCNC: 23 MMOL/L (ref 21–32)
CREAT SERPL-MCNC: 0.83 MG/DL (ref 0.67–1.17)
DEPRECATED RDW RBC: 49.1 FL (ref 39–50)
EGFRCR SERPLBLD CKD-EPI 2021: >90 ML/MIN/{1.73_M2}
EOSINOPHIL # BLD: 0.1 K/MCL (ref 0–0.5)
EOSINOPHIL NFR BLD: 2 %
ERYTHROCYTE [DISTWIDTH] IN BLOOD: 13.7 % (ref 11–15)
FASTING DURATION TIME PATIENT: ABNORMAL H
GLOBULIN SER-MCNC: 3.2 G/DL (ref 2–4)
GLUCOSE BLDC GLUCOMTR-MCNC: 145 MG/DL (ref 70–99)
GLUCOSE SERPL-MCNC: 143 MG/DL (ref 70–99)
HCT VFR BLD CALC: 47.9 % (ref 39–51)
HGB BLD-MCNC: 15.8 G/DL (ref 13–17)
IMM GRANULOCYTES # BLD AUTO: 0 K/MCL (ref 0–0.2)
IMM GRANULOCYTES # BLD: 1 %
LYMPHOCYTES # BLD: 2 K/MCL (ref 1–4)
LYMPHOCYTES NFR BLD: 23 %
MCH RBC QN AUTO: 32.2 PG (ref 26–34)
MCHC RBC AUTO-ENTMCNC: 33 G/DL (ref 32–36.5)
MCV RBC AUTO: 97.6 FL (ref 78–100)
MONOCYTES # BLD: 0.8 K/MCL (ref 0.3–0.9)
MONOCYTES NFR BLD: 9 %
NEUTROPHILS # BLD: 5.7 K/MCL (ref 1.8–7.7)
NEUTROPHILS NFR BLD: 64 %
NRBC BLD MANUAL-RTO: 0 /100 WBC
NT-PROBNP SERPL-MCNC: 101 PG/ML
P AXIS (DEGREES): 7
PLATELET # BLD AUTO: 241 K/MCL (ref 140–450)
POTASSIUM SERPL-SCNC: 4.4 MMOL/L (ref 3.4–5.1)
PR-INTERVAL (MSEC): 164
PROT SERPL-MCNC: 7.2 G/DL (ref 6.4–8.2)
QRS-INTERVAL (MSEC): 82
QT-INTERVAL (MSEC): 406
QTC: 406
R AXIS (DEGREES): 51
RBC # BLD: 4.91 MIL/MCL (ref 4.5–5.9)
REPORT TEXT: NORMAL
SODIUM SERPL-SCNC: 141 MMOL/L (ref 135–145)
T AXIS (DEGREES): 53
TROPONIN I SERPL DL<=0.01 NG/ML-MCNC: 7 NG/L
VENTRICULAR RATE EKG/MIN (BPM): 60
WBC # BLD: 8.7 K/MCL (ref 4.2–11)

## 2025-07-24 PROCEDURE — 80053 COMPREHEN METABOLIC PANEL: CPT | Performed by: EMERGENCY MEDICINE

## 2025-07-24 PROCEDURE — 99284 EMERGENCY DEPT VISIT MOD MDM: CPT | Performed by: EMERGENCY MEDICINE

## 2025-07-24 PROCEDURE — 84484 ASSAY OF TROPONIN QUANT: CPT | Performed by: EMERGENCY MEDICINE

## 2025-07-24 PROCEDURE — 93010 ELECTROCARDIOGRAM REPORT: CPT | Performed by: INTERNAL MEDICINE

## 2025-07-24 PROCEDURE — 85025 COMPLETE CBC W/AUTO DIFF WBC: CPT | Performed by: EMERGENCY MEDICINE

## 2025-07-24 PROCEDURE — 83880 ASSAY OF NATRIURETIC PEPTIDE: CPT | Performed by: EMERGENCY MEDICINE

## 2025-07-24 PROCEDURE — 93005 ELECTROCARDIOGRAM TRACING: CPT | Performed by: EMERGENCY MEDICINE

## 2025-07-24 PROCEDURE — 10002803 HB RX 637: Performed by: EMERGENCY MEDICINE

## 2025-07-24 PROCEDURE — 71046 X-RAY EXAM CHEST 2 VIEWS: CPT

## 2025-07-24 PROCEDURE — 82962 GLUCOSE BLOOD TEST: CPT

## 2025-07-24 PROCEDURE — 99285 EMERGENCY DEPT VISIT HI MDM: CPT | Performed by: EMERGENCY MEDICINE

## 2025-07-24 RX ORDER — DEXAMETHASONE 4 MG/1
10 TABLET ORAL ONCE
Status: COMPLETED | OUTPATIENT
Start: 2025-07-24 | End: 2025-07-24

## 2025-07-24 RX ORDER — METHYLPREDNISOLONE 4 MG/1
TABLET ORAL
Qty: 21 TABLET | Refills: 0 | Status: SHIPPED | OUTPATIENT
Start: 2025-07-24

## 2025-07-24 RX ORDER — IPRATROPIUM BROMIDE AND ALBUTEROL SULFATE 2.5; .5 MG/3ML; MG/3ML
3 SOLUTION RESPIRATORY (INHALATION) ONCE
Status: COMPLETED | OUTPATIENT
Start: 2025-07-24 | End: 2025-07-24

## 2025-07-24 RX ADMIN — IPRATROPIUM BROMIDE AND ALBUTEROL SULFATE 3 ML: 2.5; .5 SOLUTION RESPIRATORY (INHALATION) at 14:55

## 2025-07-24 RX ADMIN — DEXAMETHASONE 10 MG: 4 TABLET ORAL at 14:55

## 2025-07-24 ASSESSMENT — ENCOUNTER SYMPTOMS
WHEEZING: 0
NERVOUS/ANXIOUS: 0
CONFUSION: 0
POLYDIPSIA: 0
WOUND: 0
RHINORRHEA: 0
BLOOD IN STOOL: 0
NUMBNESS: 0
NAUSEA: 0
BRUISES/BLEEDS EASILY: 0
VOICE CHANGE: 0
ACTIVITY CHANGE: 0
SINUS PRESSURE: 0
SLEEP DISTURBANCE: 0
CHILLS: 0
DIAPHORESIS: 0
HEADACHES: 0
CONSTIPATION: 0
UNEXPECTED WEIGHT CHANGE: 0
VOMITING: 0
EYE REDNESS: 0
SORE THROAT: 0
SHORTNESS OF BREATH: 0
CHOKING: 0
FEVER: 0
PHOTOPHOBIA: 0
FACIAL SWELLING: 0
BACK PAIN: 0
SPEECH DIFFICULTY: 0
APPETITE CHANGE: 0
COLOR CHANGE: 0
AGITATION: 0
FATIGUE: 0
SEIZURES: 0
TROUBLE SWALLOWING: 0
EYE PAIN: 0
SINUS PAIN: 0
COUGH: 0
ADENOPATHY: 0
STRIDOR: 0
EYE DISCHARGE: 0
LIGHT-HEADEDNESS: 0
POLYPHAGIA: 0
EYE ITCHING: 0
WEAKNESS: 0
ABDOMINAL DISTENTION: 0
DIZZINESS: 0
HALLUCINATIONS: 0
CHEST TIGHTNESS: 0
TREMORS: 0
FACIAL ASYMMETRY: 0
DIARRHEA: 0
APNEA: 0
ABDOMINAL PAIN: 0

## 2025-07-24 ASSESSMENT — PAIN SCALES - GENERAL: PAINLEVEL_OUTOF10: 5

## 2025-07-29 ENCOUNTER — APPOINTMENT (OUTPATIENT)
Dept: INTERNAL MEDICINE | Age: 70
End: 2025-07-29

## 2025-08-05 ENCOUNTER — APPOINTMENT (OUTPATIENT)
Dept: INTERNAL MEDICINE | Age: 70
End: 2025-08-05

## 2025-08-07 ENCOUNTER — APPOINTMENT (OUTPATIENT)
Dept: FAMILY MEDICINE | Age: 70
End: 2025-08-07

## 2025-08-07 ENCOUNTER — LAB SERVICES (OUTPATIENT)
Dept: LAB | Age: 70
End: 2025-08-07

## 2025-08-07 VITALS
RESPIRATION RATE: 17 BRPM | DIASTOLIC BLOOD PRESSURE: 70 MMHG | BODY MASS INDEX: 25.01 KG/M2 | WEIGHT: 165 LBS | HEIGHT: 68 IN | OXYGEN SATURATION: 95 % | SYSTOLIC BLOOD PRESSURE: 134 MMHG | HEART RATE: 77 BPM | TEMPERATURE: 97.9 F

## 2025-08-07 DIAGNOSIS — J43.8 OTHER EMPHYSEMA  (CMD): ICD-10-CM

## 2025-08-07 DIAGNOSIS — G56.01 CARPAL TUNNEL SYNDROME ON RIGHT: ICD-10-CM

## 2025-08-07 DIAGNOSIS — R07.89 ATYPICAL CHEST PAIN: ICD-10-CM

## 2025-08-07 DIAGNOSIS — I10 PRIMARY HYPERTENSION: ICD-10-CM

## 2025-08-07 DIAGNOSIS — I73.9 PAD (PERIPHERAL ARTERY DISEASE) (CMD): ICD-10-CM

## 2025-08-07 DIAGNOSIS — F17.200 SMOKER: ICD-10-CM

## 2025-08-07 DIAGNOSIS — F41.9 ANXIETY: ICD-10-CM

## 2025-08-07 DIAGNOSIS — F33.0 MILD RECURRENT MAJOR DEPRESSION (CMD): ICD-10-CM

## 2025-08-07 DIAGNOSIS — E11.9 CONTROLLED TYPE 2 DIABETES MELLITUS WITHOUT COMPLICATION, WITHOUT LONG-TERM CURRENT USE OF INSULIN (CMD): Primary | ICD-10-CM

## 2025-08-07 DIAGNOSIS — E78.2 MIXED HYPERLIPIDEMIA: ICD-10-CM

## 2025-08-07 PROBLEM — R09.89 CHEST CONGESTION: Status: RESOLVED | Noted: 2025-05-13 | Resolved: 2025-08-07

## 2025-08-07 RX ORDER — BLOOD SUGAR DIAGNOSTIC
STRIP MISCELLANEOUS
Qty: 100 STRIP | Refills: 6 | Status: SHIPPED | OUTPATIENT
Start: 2025-08-07

## 2025-08-07 RX ORDER — LANCETS 30 GAUGE
EACH MISCELLANEOUS
Qty: 100 EACH | Refills: 6 | Status: SHIPPED | OUTPATIENT
Start: 2025-08-07

## 2025-08-07 ASSESSMENT — ENCOUNTER SYMPTOMS
ABDOMINAL PAIN: 0
DIZZINESS: 0
NERVOUS/ANXIOUS: 1
SHORTNESS OF BREATH: 1
FATIGUE: 0
EYES NEGATIVE: 1
ENDOCRINE NEGATIVE: 1
BRUISES/BLEEDS EASILY: 0
WEAKNESS: 1

## 2025-08-07 ASSESSMENT — PATIENT HEALTH QUESTIONNAIRE - PHQ9
SUM OF ALL RESPONSES TO PHQ9 QUESTIONS 1 AND 2: 0
SUM OF ALL RESPONSES TO PHQ9 QUESTIONS 1 AND 2: 0
1. LITTLE INTEREST OR PLEASURE IN DOING THINGS: NOT AT ALL
2. FEELING DOWN, DEPRESSED OR HOPELESS: NOT AT ALL
CLINICAL INTERPRETATION OF PHQ2 SCORE: NO FURTHER SCREENING NEEDED

## 2025-08-11 ENCOUNTER — RESULTS FOLLOW-UP (OUTPATIENT)
Dept: FAMILY MEDICINE | Age: 70
End: 2025-08-11

## 2025-08-12 DIAGNOSIS — E11.9 CONTROLLED TYPE 2 DIABETES MELLITUS WITHOUT COMPLICATION, WITHOUT LONG-TERM CURRENT USE OF INSULIN (CMD): Primary | ICD-10-CM

## 2025-08-12 RX ORDER — BLOOD-GLUCOSE METER
1 EACH MISCELLANEOUS SEE ADMIN INSTRUCTIONS
Qty: 1 KIT | Refills: 0 | Status: SHIPPED | OUTPATIENT
Start: 2025-08-12

## 2025-08-13 ENCOUNTER — APPOINTMENT (OUTPATIENT)
Dept: CARDIOLOGY | Age: 70
End: 2025-08-13

## 2025-08-13 VITALS
WEIGHT: 164.2 LBS | SYSTOLIC BLOOD PRESSURE: 132 MMHG | DIASTOLIC BLOOD PRESSURE: 80 MMHG | HEIGHT: 68 IN | HEART RATE: 76 BPM | BODY MASS INDEX: 24.89 KG/M2

## 2025-08-13 DIAGNOSIS — I10 PRIMARY HYPERTENSION: ICD-10-CM

## 2025-08-13 DIAGNOSIS — E11.9 CONTROLLED TYPE 2 DIABETES MELLITUS WITHOUT COMPLICATION, WITHOUT LONG-TERM CURRENT USE OF INSULIN (CMD): ICD-10-CM

## 2025-08-13 DIAGNOSIS — E78.2 MIXED HYPERLIPIDEMIA: ICD-10-CM

## 2025-08-13 DIAGNOSIS — R07.89 ATYPICAL CHEST PAIN: ICD-10-CM

## 2025-08-13 DIAGNOSIS — F17.200 SMOKER: ICD-10-CM

## 2025-08-13 DIAGNOSIS — I49.3 FREQUENT PVCS: Primary | ICD-10-CM

## 2025-08-13 DIAGNOSIS — R00.2 PALPITATIONS: ICD-10-CM

## 2025-08-13 SDOH — HEALTH STABILITY: MENTAL HEALTH: HOW OFTEN DO YOU HAVE 6 OR MORE DRINKS ON ONE OCCASION?: WEEKLY

## 2025-08-13 SDOH — HEALTH STABILITY: MENTAL HEALTH: HOW OFTEN DO YOU HAVE A DRINK CONTAINING ALCOHOL?: 4 OR MORE TIMES PER WEEK

## 2025-08-13 SDOH — HEALTH STABILITY: MENTAL HEALTH: HOW MANY STANDARD DRINKS CONTAINING ALCOHOL DO YOU HAVE ON A TYPICAL DAY?: 5 OR 6

## 2025-08-13 SDOH — HEALTH STABILITY: MENTAL HEALTH: AUDIT TOTAL SCORE: 9

## 2025-08-13 ASSESSMENT — PATIENT HEALTH QUESTIONNAIRE - PHQ9
CLINICAL INTERPRETATION OF PHQ2 SCORE: FURTHER SCREENING NEEDED
1. LITTLE INTEREST OR PLEASURE IN DOING THINGS: NEARLY EVERY DAY
2. FEELING DOWN, DEPRESSED OR HOPELESS: NEARLY EVERY DAY
1. LITTLE INTEREST OR PLEASURE IN DOING THINGS: SEVERAL DAYS
SUM OF ALL RESPONSES TO PHQ9 QUESTIONS 1 AND 2: 6
7. TROUBLE CONCENTRATING ON THINGS, SUCH AS READING THE NEWSPAPER OR WATCHING TELEVISION: NEARLY EVERY DAY
8. MOVING OR SPEAKING SO SLOWLY THAT OTHER PEOPLE COULD HAVE NOTICED. OR THE OPPOSITE, BEING SO FIGETY OR RESTLESS THAT YOU HAVE BEEN MOVING AROUND A LOT MORE THAN USUAL: NEARLY EVERY DAY
CLINICAL INTERPRETATION OF PHQ9 SCORE: POSITIVE, SEVERE DEPRESSION
CLINICAL INTERPRETATION OF PHQ2 SCORE: NO FURTHER SCREENING NEEDED
SUM OF ALL RESPONSES TO PHQ9 QUESTIONS 1 AND 2: 6
4. FEELING TIRED OR HAVING LITTLE ENERGY: NEARLY EVERY DAY
9. THOUGHTS THAT YOU WOULD BE BETTER OFF DEAD, OR OF HURTING YOURSELF: NOT AT ALL
SUM OF ALL RESPONSES TO PHQ9 QUESTIONS 1 AND 2: 2
SUM OF ALL RESPONSES TO PHQ9 QUESTIONS 1 AND 2: 2
SUM OF ALL RESPONSES TO PHQ QUESTIONS 1-9: 21
5. POOR APPETITE, WEIGHT LOSS, OR OVEREATING: NEARLY EVERY DAY
6. FEELING BAD ABOUT YOURSELF - OR THAT YOU ARE A FAILURE OR HAVE LET YOURSELF OR YOUR FAMILY DOWN: NOT AT ALL
3. TROUBLE FALLING OR STAYING ASLEEP OR SLEEPING TOO MUCH: NEARLY EVERY DAY
2. FEELING DOWN, DEPRESSED OR HOPELESS: SEVERAL DAYS
10. IF YOU CHECKED OFF ANY PROBLEMS, HOW DIFFICULT HAVE THESE PROBLEMS MADE IT FOR YOU TO DO YOUR WORK, TAKE CARE OF THINGS AT HOME, OR GET ALONG WITH OTHER PEOPLE: NOT DIFFICULT AT ALL

## 2026-01-28 ENCOUNTER — APPOINTMENT (OUTPATIENT)
Dept: FAMILY MEDICINE | Age: 71
End: 2026-01-28

## 2026-04-20 ENCOUNTER — APPOINTMENT (OUTPATIENT)
Dept: PULMONOLOGY | Age: 71
End: 2026-04-20

## 2026-08-14 ENCOUNTER — APPOINTMENT (OUTPATIENT)
Dept: CARDIOLOGY | Age: 71
End: 2026-08-14

## (undated) DEVICE — Device

## (undated) DEVICE — LAWSON - PACK LAPAROTOMY SSH